# Patient Record
Sex: MALE | Race: WHITE | Employment: OTHER | URBAN - METROPOLITAN AREA
[De-identification: names, ages, dates, MRNs, and addresses within clinical notes are randomized per-mention and may not be internally consistent; named-entity substitution may affect disease eponyms.]

---

## 2017-05-16 ENCOUNTER — CLINICAL SUPPORT (OUTPATIENT)
Dept: CARDIOLOGY CLINIC | Age: 57
End: 2017-05-16

## 2017-05-16 ENCOUNTER — OFFICE VISIT (OUTPATIENT)
Dept: CARDIOLOGY CLINIC | Age: 57
End: 2017-05-16

## 2017-05-16 VITALS
SYSTOLIC BLOOD PRESSURE: 130 MMHG | HEART RATE: 57 BPM | OXYGEN SATURATION: 97 % | BODY MASS INDEX: 28.35 KG/M2 | DIASTOLIC BLOOD PRESSURE: 80 MMHG | HEIGHT: 75 IN | WEIGHT: 228 LBS

## 2017-05-16 DIAGNOSIS — Z95.0 PACEMAKER: ICD-10-CM

## 2017-05-16 DIAGNOSIS — I11.9 BENIGN HYPERTENSIVE HEART DISEASE WITHOUT HEART FAILURE: ICD-10-CM

## 2017-05-16 DIAGNOSIS — R55 VASODEPRESSOR SYNCOPE: ICD-10-CM

## 2017-05-16 DIAGNOSIS — I49.5 SSS (SICK SINUS SYNDROME) (HCC): Primary | ICD-10-CM

## 2017-05-16 DIAGNOSIS — E78.00 HYPERCHOLESTEROLEMIA: ICD-10-CM

## 2017-05-16 DIAGNOSIS — I49.5 SSS (SICK SINUS SYNDROME) (HCC): ICD-10-CM

## 2017-05-16 DIAGNOSIS — I35.0 AORTIC STENOSIS, MILD: Primary | ICD-10-CM

## 2017-05-16 RX ORDER — GLUCOSAMINE SULFATE 1500 MG
POWDER IN PACKET (EA) ORAL DAILY
COMMUNITY

## 2017-05-16 RX ORDER — ROSUVASTATIN CALCIUM 10 MG/1
10 TABLET, COATED ORAL
Qty: 30 TAB | Refills: 6 | Status: SHIPPED | OUTPATIENT
Start: 2017-05-16 | End: 2017-11-27

## 2017-05-16 NOTE — PATIENT INSTRUCTIONS
Please start taking over the counter Coenzyme Q 10 - 200-400 mg daily along the the Crestor 10 mg daily

## 2017-05-16 NOTE — MR AVS SNAPSHOT
Visit Information Date & Time Provider Department Dept. Phone Encounter #  
 5/16/2017  4:00 PM Verónica Harris DO [facility] [id number] Follow-up Instructions Return in about 6 months (around 11/16/2017), or if symptoms worsen or fail to improve. Your Appointments 11/27/2017  2:40 PM  
Follow Up with Verónica Harris DO [facility] ([facility]) Appt Note: Return in about 6 months [place] [id number] [address] [address] [phone] [address] Upcoming Health Maintenance Date Due Hepatitis C Screening 1960 DTaP/Tdap/Td series (1 - Tdap) 2/13/1983 FOBT Q 1 YEAR AGE 50-75 2/13/2012 INFLUENZA AGE 9 TO ADULT 8/1/2017 Allergies as of 5/16/2017  Review Complete On: 5/28/2015 By: Verónica Harris DO No Known Allergies Current Immunizations  Never Reviewed No immunizations on file. Not reviewed this visit You Were Diagnosed With   
  
 Codes Comments Aortic stenosis, mild    -  Primary ICD-10-CM: I35.0 ICD-9-CM: 424.1 Benign hypertensive heart disease without heart failure     ICD-10-CM: I11.9 ICD-9-CM: 402.10   
 SSS (sick sinus syndrome) (HCC)     ICD-10-CM: I49.5 ICD-9-CM: 427.81 Vasodepressor syncope     ICD-10-CM: R55 
ICD-9-CM: 780.2 Pacemaker     ICD-10-CM: Z95.0 ICD-9-CM: V45.01 Vitals BP Pulse Height(growth percentile) Weight(growth percentile) SpO2 BMI  
 140/88 (!) 57 6' 3\" (1.905 m) 228 lb (103.4 kg) 97% 28.5 kg/m2 Smoking Status Never Smoker Vitals History BMI and BSA Data Body Mass Index Body Surface Area 28.5 kg/m 2 2.34 m 2 Preferred Pharmacy Pharmacy Name Phone [phone] [address] [facility], [address] [place] [address] [phone] Your Updated Medication List  
  
   
This list is accurate as of: 5/16/17  4:48 PM.  Always use your most recent med list.  
  
  
  
  
 aspirin delayed-release 81 mg tablet Take 162 mg by mouth daily. FISH -160-1,000 mg Cap Generic drug:  omega 3-dha-epa-fish oil Take  by mouth.  
  
 lisinopril-hydroCHLOROthiazide 20-12.5 mg per tablet Commonly known as:  Loren Wyman Take 1 tablet by mouth daily. multivitamins Chew Take 1 Tab by mouth daily. NexIUM 40 mg capsule Generic drug:  esomeprazole Take 40 mg by mouth every seven (7) days. rosuvastatin 10 mg tablet Commonly known as:  CRESTOR Take 1 Tab by mouth nightly. VITAMIN D3 1,000 unit Cap Generic drug:  cholecalciferol Take  by mouth daily. Prescriptions Sent to Pharmacy Refills  
 rosuvastatin (CRESTOR) 10 mg tablet 6 Sig: Take 1 Tab by mouth nightly. Class: Normal  
 Pharmacy: [address] #: [phone] Route: Oral  
  
We Performed the Following AMB POC EKG ROUTINE W/ 12 LEADS, INTER & REP [84064 CPT(R)] Follow-up Instructions Return in about 6 months (around 11/16/2017), or if symptoms worsen or fail to improve. Patient Instructions Please start taking over the counter Coenzyme Q 10 - 200-400 mg daily along the the Crestor 10 mg daily Introducing \[facility]\"" & HEALTH SERVICES! Leisa Palomino introduces [employer] patient portal. Now you can access parts of your medical record, email your doctor's office, and request medication refills online. 1. In your internet browser, go to [email or web address] 2. Click on the First Time User? Click Here link in the Sign In box. You will see the New Member Sign Up page. 3. Enter your [employer] Access Code exactly as it appears below. You will not need to use this code after youve completed the sign-up process.  If you do not sign up before the expiration date, you must request a new code. · [employer] Access Code: [email or web address] Expires: 8/14/2017  4:48 PM 
 
4. Enter the last four digits of your Social Security Number (xxxx) and Date of Birth (mm/dd/yyyy) as indicated and click Submit. You will be taken to the next sign-up page. 5. Create a [employer] ID. This will be your [employer] login ID and cannot be changed, so think of one that is secure and easy to remember. 6. Create a [employer] password. You can change your password at any time. 7. Enter your Password Reset Question and Answer. This can be used at a later time if you forget your password. 8. Enter your e-mail address. You will receive e-mail notification when new information is available in [address] 9. Click Sign Up. You can now view and download portions of your medical record. 10. Click the Download Summary menu link to download a portable copy of your medical information. If you have questions, please visit the Frequently Asked Questions section of the [employer] website. Remember, [employer] is NOT to be used for urgent needs. For medical emergencies, dial 911. Now available from your iPhone and Android! Please provide this summary of care documentation to your next provider. Your primary care clinician is listed as Carmen Garcia. If you have any questions after today's visit, please call [phone].

## 2017-05-16 NOTE — PROGRESS NOTES
HPI: I saw Raegan Durham in my office today in cardiovascular evaluation regarding sick sinus syndrome and his aortic valvular disease. Mr. Shankar Merchant is a pleasant 56year old  male who has moderate aortic stenosis and also has sick sinus syndrome with significant pauses, for which he is status post a dual chamber Medtronic pacemaker implantation by my associate, Dr. Luisa Laird, back on 11. Historically his problem has actually been vasovagal presyncope with a strong cardioinhibitory component with 4-5 second pauses, which actually prompted the permanent pacemaker.     He also has some history of aortic stenosis, which has been mild back in  and was mild to moderate when checked in 2014, with a mean gradient of 18 mmHg across the valve. He comes in today and relates that he still doing quite well. He gets a little aching in his legs with walking which sounds like possible claudication but he also has some arthritis in his lower low back and have some component of his problem which is related to a spinal stenosis. He is concerned about his potential risk of cardiovascular event and that his older brother just had bypass surgery over at the [facility] within the past several months and his father had heart attacks in his 45s and  of an MI at age 48. Encounter Diagnoses   Name Primary?  Aortic stenosis, mild Yes    Hypertensive heart disease      SSS (sick sinus syndrome) ([address]     Pacemaker     Hypercholesterolemia     Vasodepressor syncope        Discussion: This gentleman appears to be doing reasonably well clinically without any symptoms to suggest worsening aortic stenosis or developing symptomatic obstructive coronary artery disease.   He has a well-preserved second heart sound that I still do not feel that his aortic stenosis is more than moderate am not going to do an echocardiogram at this time although we may consider doing one later this year.    He does have history of very high cholesterol and his most recent cholesterol which was done on March 7, 2017 showed total cholesterol 256 with an HDL of 33, LDL of 195, and VLDL of 28. This is obviously extremely high and in the past we did have him on Crestor at one time, but he came off of that for unclear reasons and I would recommend that we put him back on Crestor and I have sent in a prescription for 10 mg a day and we will plan to repeat a lipid profile in a couple months if he tolerates that medication. I have also asked him to take coenzyme Q 10 200-400 mg daily to guard against potential myalgias from Crestor. In view of his significantly elevated risk of developing symptomatic coronary artery disease in his lifetime. I do think that doing a coronary calcium score would be a good idea to get a better idea about his risk of coronary events in the next 10 years and this may also help us assess how aggressively to treat his cholesterol. I will did check his pacemaker today and he has tenderness half years left on his battery and he had no arrhythmic issues. I will plan to see him again in 6 months. PCP:  Shania Knight MD        Past Medical History:   Diagnosis Date    Cancer [facility])     malignant melanoma removed    Diabetes ([address]     pre-diabetic    Diverticular disease     GERD (gastroesophageal reflux disease)     History of echocardiogram 10/06/2014    EF 59%. No WMA. Gr 1 DDfx. Coronary cusps of AV appear fused. Reduced mobility vs bicuspid. Mild AS (mean grad 18 mmHg).  History of exercise stress test 12/24/2010    [facility]:  Normal stress test at [facility] workload. Ex time 9 min 39 sec.       Hyperlipidemia     Hypertension     Obstructive sleep apnea     probable    Pacemaker 1/21/11    SSS (sick sinus syndrome) (HCC)     with reportedly prolonged pauses of 4 to 5 seconds, status post implantation of dual-chamber Medtronic pacemaker 1/20/11 Past Surgical History:   Procedure Laterality Date    HX HERNIA REPAIR      bilateral    HX ORTHOPAEDIC      foot surgery    HX PACEMAKER  1/20/11    Dual-chamber Medtronic pacemaker         Current Outpatient Rx   Name  Route  Sig  Dispense  Refill    lisinopril-hydrochlorothiazide (PRINZIDE) 20-12.5 mg per tablet    Oral    Take 1 tablet by mouth daily. 90 tablet    3      esomeprazole (NEXIUM) 40 mg capsule    Oral    Take 40 mg by mouth every seven (7) days.  multivitamins Chew    Oral    Take 1 Tab by mouth daily.  aspirin delayed-release 81 mg tablet    Oral    Take 162 mg by mouth daily. No Known Allergies    Social History     Social History    Marital status:      Spouse name: N/A    Number of children: N/A    Years of education: N/A     Social History Main Topics    Smoking status: Never Smoker    Smokeless tobacco: Never Used    Alcohol use Yes      Comment: moderately    Drug use: No    Sexual activity: Not Asked     Other Topics Concern    None     Social History Narrative       No family history on file. Review of Systems:   Constitutional: Positive for malaise/fatigue and weight loss. Negative for chills, diaphoresis and fever. Respiratory: Negative. Cardiovascular: Positive for claudication. Negative for chest pain, palpitations, orthopnea, leg swelling and PND. Gastrointestinal: Positive for abdominal pain. Negative for blood in stool, constipation, diarrhea, heartburn, melena, nausea and vomiting. Musculoskeletal: Positive for back pain, myalgias and neck pain. Negative for falls and joint pain. Neurological: Positive for weakness. Physical Exam:   The patient is an alert, oriented, well developed, well nourished 56 y.o.  male who was in no acute distress at the time of my examination.   Visit Vitals    /80 (BP 1 Location: Left arm, BP Patient Position: Sitting)    Pulse (!) 57    Ht 6' 3\" (1.905 m)    Wt 103.4 kg (228 lb)    SpO2 97%    BMI 28.5 kg/m2        BP Readings from Last 3 Encounters:   05/16/17 130/80   05/28/15 126/78   10/06/14 128/80        Wt Readings from Last 3 Encounters:   05/16/17 103.4 kg (228 lb)   05/28/15 104.8 kg (231 lb)   10/06/14 108 kg (238 lb)       HEENT: Conjunctivae white, mucosa moist, no pallor or cyanosis. Neck: Supple without masses, tenderness or thyromegaly. No jugular venous distention. Carotid upstrokes are full bilaterally, without bruits. Cardiovascular: Chest is symmetrical with good excursion. [place] is not displaced. No lifts, heaves or thrills. S1 and S2 are normal.  There is a grade II/VI ALVA heard at the apex and along the LSB, with radiation to the base, without appreciable diastolic murmurs, rubs, clicks or gallops. Lungs: Clear to auscultation in all fields. Abdomen: Soft. No masses, tenderness or organomegaly. Extremities: No edema, with full peripheral pulses. Review of Data: Please refer to past medical history for most recent cardiac testing. Results for orders placed or performed in visit on 05/16/17   AMB POC EKG ROUTINE W/ 12 LEADS, INTER & REP     Status: None    Narrative    Sinus bradycardia, rate 57. This EKG is within normal limits and similar to the EKG of May 28, 2015. Cristina Brock D.O., F.A.C.C. [facility]  [facility] [place]  [address] [place] [address]. [address]    PLEASE NOTE:  This document has been produced using voice recognition software. Unrecognized errors in transcription may be present.

## 2017-05-16 NOTE — PROGRESS NOTES
Review of Systems   Constitutional: Positive for malaise/fatigue and weight loss. Negative for chills, diaphoresis and fever. Respiratory: Negative. Cardiovascular: Positive for claudication. Negative for chest pain, palpitations, orthopnea, leg swelling and PND. Gastrointestinal: Positive for abdominal pain. Negative for blood in stool, constipation, diarrhea, heartburn, melena, nausea and vomiting. Musculoskeletal: Positive for back pain, myalgias and neck pain. Negative for falls and joint pain. Neurological: Positive for weakness.

## 2017-05-18 NOTE — PROGRESS NOTES
I have personally seen and evaluated the device findings. Interrogation reviewed and I agree with assessment.     Margie Manuel

## 2017-11-27 ENCOUNTER — CLINICAL SUPPORT (OUTPATIENT)
Dept: CARDIOLOGY CLINIC | Age: 57
End: 2017-11-27

## 2017-11-27 ENCOUNTER — OFFICE VISIT (OUTPATIENT)
Dept: CARDIOLOGY CLINIC | Age: 57
End: 2017-11-27

## 2017-11-27 VITALS
HEART RATE: 65 BPM | DIASTOLIC BLOOD PRESSURE: 94 MMHG | OXYGEN SATURATION: 97 % | WEIGHT: 245 LBS | BODY MASS INDEX: 30.62 KG/M2 | SYSTOLIC BLOOD PRESSURE: 142 MMHG

## 2017-11-27 DIAGNOSIS — Z95.0 PACEMAKER: ICD-10-CM

## 2017-11-27 DIAGNOSIS — I11.9 BENIGN HYPERTENSIVE HEART DISEASE WITHOUT HEART FAILURE: ICD-10-CM

## 2017-11-27 DIAGNOSIS — I49.5 SSS (SICK SINUS SYNDROME) (HCC): ICD-10-CM

## 2017-11-27 DIAGNOSIS — I49.5 SSS (SICK SINUS SYNDROME) (HCC): Primary | ICD-10-CM

## 2017-11-27 DIAGNOSIS — I35.0 AORTIC STENOSIS, MILD: Primary | ICD-10-CM

## 2017-11-27 DIAGNOSIS — E78.5 HYPERLIPIDEMIA, UNSPECIFIED HYPERLIPIDEMIA TYPE: ICD-10-CM

## 2017-11-27 NOTE — PROGRESS NOTES
HPI:  I saw Reginald Guzman in my office today in cardiovascular evaluation regarding sick sinus syndrome and his aortic valvular disease. Mr. Gurmeet Solano is a pleasant 56year old  male who has moderate aortic stenosis and also has sick sinus syndrome with significant pauses, for which he is status post a dual chamber Medtronic pacemaker implantation by my associate, Dr. Willi Cheung, back on 1/20/11. Historically his problem has actually been vasovagal presyncope with a strong cardioinhibitory component with 4-5 second pauses, which actually prompted the permanent pacemaker.      He also has some history of aortic stenosis, which has been mild back in 2011 and was mild to moderate when checked in November of 2014, with a mean gradient of 18 mmHg across the valve.     He comes to the office today and relates that he is doing reasonably well currently, but he had a lot of problems with chest heaviness, shortness of breath, and lower extremity weakness as well as tingling in his legs which was so bad when he was in [place] that he went into a hospital there and was worked up with a negative nuclear myocardial perfusion study reportedly. He relates that he subsequently discontinued his Crestor on his own because he read that a lot of the symptoms that he was having might be related to the Crestor and in fact all of his symptoms resolved. He otherwise seems to be doing well without any cardiovascular symptomatology at this time except for 2 pillow orthopnea. Encounter Diagnoses   Name Primary?  Aortic stenosis, mild to moderate Yes    Hypertensive heart disease      SSS (sick sinus syndrome) (HCC)     Pacemaker     Hyperlipidemia, unspecified hyperlipidemia type        Discussion: This gentleman appears to be doing quite well at this juncture and I have no recommendations for change.   He was having a lot of symptoms including chest pain, shortness of breath, and lower extremity weakness all of which resolved when he discontinued Crestor. He does have some risk factors for coronary artery disease and we discussed the pros and cons of whether or not to try other means of lowering his cholesterol, but we decided that the best first test may be to do a coronary calcium score and if that significantly elevated then discuss further treatment of his cholesterol issues and if the coronary calcium score is quite low we will simply not treat his cholesterol in the future. We did check his pacemaker today and his pacemaker is working completely normally without any issues and 10-1/2 years remaining on the dual-chamber Medtronic pacemaker which was placed back in 2011. His blood pressure is borderline elevated today and I recommended that he get his blood pressure checked a few times and if it staying elevated above 027 systolic he should follow-up with Dr. Mandy Craig. Since he is otherwise doing well I will plan to see him in several months. PCP:  Ronel Gavin MD        Past Medical History:   Diagnosis Date    Cancer [facility])     malignant melanoma removed    Diabetes ([address]     pre-diabetic    Diverticular disease     GERD (gastroesophageal reflux disease)     History of echocardiogram 10/06/2014    EF 59%. No WMA. Gr 1 DDfx. Coronary cusps of AV appear fused. Reduced mobility vs bicuspid. Mild AS (mean grad 18 mmHg).  History of exercise stress test 12/24/2010    [facility]:  Normal stress test at [facility] workload. Ex time 9 min 39 sec.       Hyperlipidemia     Hypertension     Obstructive sleep apnea     probable    Pacemaker 1/21/11    SSS (sick sinus syndrome) (HCC)     with reportedly prolonged pauses of 4 to 5 seconds, status post implantation of dual-chamber Medtronic pacemaker 1/20/11         Past Surgical History:   Procedure Laterality Date    HX HERNIA REPAIR      bilateral    HX ORTHOPAEDIC      foot surgery    HX PACEMAKER  1/20/11    Dual-chamber Medtronic pacemaker         Current Outpatient Rx   Name  Route  Sig  Dispense  Refill    lisinopril-hydrochlorothiazide (PRINZIDE) 20-12.5 mg per tablet    Oral    Take 1 tablet by mouth daily. 90 tablet    3      esomeprazole (NEXIUM) 40 mg capsule    Oral    Take 40 mg by mouth every seven (7) days.  multivitamins Chew    Oral    Take 1 Tab by mouth daily.  aspirin delayed-release 81 mg tablet    Oral    Take 162 mg by mouth daily. Allergies   Allergen Reactions    Crestor [Rosuvastatin] Myalgia     And sob        Social History     Social History    Marital status:      Spouse name: N/A    Number of children: N/A    Years of education: N/A     Social History Main Topics    Smoking status: Never Smoker    Smokeless tobacco: Never Used    Alcohol use Yes      Comment: moderately    Drug use: No    Sexual activity: Not Asked     Other Topics Concern    None     Social History Narrative       No family history on file. Review of Systems:   Constitutional: Negative for chills, fever, malaise/fatigue and weight loss. Respiratory: Positive for cough and shortness of breath. Negative for hemoptysis and wheezing. Cardiovascular: Positive for orthopnea. Negative for chest pain, palpitations and leg swelling. Musculoskeletal: Positive for joint pain and myalgias. Negative for falls. Neurological: Positive for dizziness. Physical Exam:   The patient is an alert, oriented, well developed, well nourished 56 y.o.  male who was in no acute distress at the time of my examination.   Visit Vitals    BP (!) 142/94    Pulse 65    Wt 111.1 kg (245 lb)    SpO2 97%    BMI 30.62 kg/m2        BP Readings from Last 3 Encounters:   11/27/17 (!) 142/94   05/16/17 130/80   05/28/15 126/78        Wt Readings from Last 3 Encounters:   11/27/17 111.1 kg (245 lb)   05/16/17 103.4 kg (228 lb)   05/28/15 104.8 kg (231 lb)       HEENT: Conjunctivae white, mucosa moist, no pallor or cyanosis. Neck: Supple without masses, tenderness or thyromegaly. No jugular venous distention. Carotid upstrokes are full bilaterally, without bruits. Cardiovascular: Chest is symmetrical with good excursion. [place] is not displaced. No lifts, heaves or thrills. S1 and S2 are normal.  There is a grade II/VI ALVA heard at the apex and along the LSB, with radiation to the base, without appreciable diastolic murmurs, rubs, clicks or gallops. Lungs: Clear to auscultation in all fields. Abdomen: Soft. No masses, tenderness or organomegaly. Extremities: No edema, with full peripheral pulses. Review of Data: Please refer to past medical history for most recent cardiac testing. Results for orders placed or performed in visit on 11/27/17   AMB POC EKG ROUTINE W/ 12 LEADS, INTER & REP     Status: None    Narrative    Normal sinus rhythm rate 65. Questionable inferior Q's in 3 and aVF to suggest possible past inferior infarction. Otherwise tracing is within normal limits and similar to the EKG of May 16, 2017. Brenna Booth D.O., F.A.C.C. [facility]  [facility] [place]  [address] [facility]. [address]    PLEASE NOTE:  This document has been produced using voice recognition software. Unrecognized errors in transcription may be present.

## 2017-11-27 NOTE — MR AVS SNAPSHOT
Visit Information Date & Time Provider Department Dept. Phone Encounter #  
 11/27/2017  2:40 PM Krunal Castellanos, [address] [facility] [address] [id number] Your Appointments 11/26/2018 10:00 AM  
Follow Up with Krunal Castellanos DO [facility] ([address]) Appt Note: 1 year follow up with an EKG  
 Ryne [place] [id number] [phone] [address] [place] [address] [address] Upcoming Health Maintenance Date Due Hepatitis C Screening 1960 DTaP/Tdap/Td series (1 - Tdap) 2/13/1983 FOBT Q 1 YEAR AGE 50-75 2/13/2012 Influenza Age 5 to Adult 8/1/2017 Allergies as of 11/27/2017  Review Complete On: 11/27/2017 By: Krunal Castellanos DO Severity Noted Reaction Type Reactions Crestor [Rosuvastatin]  11/27/2017    Myalgia And sob Current Immunizations  Never Reviewed No immunizations on file. Not reviewed this visit You Were Diagnosed With   
  
 Codes Comments Aortic stenosis, mild    -  Primary ICD-10-CM: I35.0 ICD-9-CM: 424.1 Benign hypertensive heart disease without heart failure     ICD-10-CM: I11.9 ICD-9-CM: 402.10   
 SSS (sick sinus syndrome) (HCC)     ICD-10-CM: I49.5 ICD-9-CM: 427.81 Pacemaker     ICD-10-CM: Z95.0 ICD-9-CM: V45.01 Hyperlipidemia, unspecified hyperlipidemia type     ICD-10-CM: E78.5 ICD-9-CM: 272.4 Vitals BP Pulse Weight(growth percentile) SpO2 BMI Smoking Status (!) 142/94 65 245 lb (111.1 kg) 97% 30.62 kg/m2 Never Smoker Vitals History BMI and BSA Data Body Mass Index Body Surface Area  
 30.62 kg/m 2 2.42 m 2 Preferred Pharmacy Pharmacy Name Phone [phone] [address] [facility], [address] [place] [address] [phone] Your Updated Medication List  
  
   
 This list is accurate as of: 11/27/17  3:40 PM.  Always use your most recent med list.  
  
  
  
  
 aspirin delayed-release 81 mg tablet Take 162 mg by mouth daily. FISH -160-1,000 mg Cap Generic drug:  omega 3-dha-epa-fish oil Take  by mouth.  
  
 lisinopril-hydroCHLOROthiazide 20-12.5 mg per tablet Commonly known as:  Park Isaacs Take 1 tablet by mouth daily. multivitamins Chew Take 1 Tab by mouth daily. NexIUM 40 mg capsule Generic drug:  esomeprazole Take 40 mg by mouth every seven (7) days. VITAMIN D3 1,000 unit Cap Generic drug:  cholecalciferol Take  by mouth daily. We Performed the Following AMB POC EKG ROUTINE W/ 12 LEADS, INTER & REP [11997 CPT(R)] Introducing [facility]! Kyleigh Elder introduces [facility] patient portal. Now you can access parts of your medical record, email your doctor's office, and request medication refills online. 1. In your internet browser, go to [email or web address] 2. Click on the First Time User? Click Here link in the Sign In box. You will see the New Member Sign Up page. 3. Enter your [facility] Access Code exactly as it appears below. You will not need to use this code after youve completed the sign-up process. If you do not sign up before the expiration date, you must request a new code. · [facility] Access Code: [email or web address] Expires: 2/25/2018  3:40 PM 
 
4. Enter the last four digits of your Social Security Number (xxxx) and Date of Birth (mm/dd/yyyy) as indicated and click Submit. You will be taken to the next sign-up page. 5. Create a "[employer]t ID. This will be your [facility] login ID and cannot be changed, so think of one that is secure and easy to remember. 6. Create a "[employer]t password. You can change your password at any time. 7. Enter your Password Reset Question and Answer. This can be used at a later time if you forget your password. 8. Enter your e-mail address. You will receive e-mail notification when new information is available in [address] 9. Click Sign Up. You can now view and download portions of your medical record. 10. Click the Download Summary menu link to download a portable copy of your medical information. If you have questions, please visit the Frequently Asked Questions section of the [employer] website. Remember, [employer] is NOT to be used for urgent needs. For medical emergencies, dial 911. Now available from your iPhone and Android! Please provide this summary of care documentation to your next provider. Your primary care clinician is listed as Sara Marroquin. If you have any questions after today's visit, please call [phone].

## 2017-11-27 NOTE — PROGRESS NOTES
1. Have you been to the ER, urgent care clinic since your last visit? Hospitalized since your last visit?no    2. Have you seen or consulted any other health care providers outside of the [address] [place] [address] since your last visit? Include any pap smears or colon screening.  no

## 2018-03-12 ENCOUNTER — TELEPHONE (OUTPATIENT)
Dept: CARDIOLOGY CLINIC | Age: 58
End: 2018-03-12

## 2018-03-12 NOTE — TELEPHONE ENCOUNTER
Returned patient's call . .. He is requesting some of his medical records be sent to his . I informed him that he will have to send us a signed medical records release form. He is going to see if his 's office has one . Matthias Gabriel If so, he will fill it out and fax it to us. If not, he will call back to have me fax one to him for him to fill out and return.

## 2019-03-13 ENCOUNTER — OFFICE VISIT (OUTPATIENT)
Dept: CARDIOLOGY CLINIC | Age: 59
End: 2019-03-13

## 2019-03-13 ENCOUNTER — CLINICAL SUPPORT (OUTPATIENT)
Dept: CARDIOLOGY CLINIC | Age: 59
End: 2019-03-13

## 2019-03-13 VITALS
WEIGHT: 235 LBS | OXYGEN SATURATION: 96 % | HEIGHT: 75 IN | BODY MASS INDEX: 29.22 KG/M2 | DIASTOLIC BLOOD PRESSURE: 98 MMHG | HEART RATE: 54 BPM | SYSTOLIC BLOOD PRESSURE: 150 MMHG

## 2019-03-13 DIAGNOSIS — I49.5 SSS (SICK SINUS SYNDROME) (HCC): Primary | ICD-10-CM

## 2019-03-13 DIAGNOSIS — Z95.0 PACEMAKER: ICD-10-CM

## 2019-03-13 DIAGNOSIS — I20.8 EXERTIONAL ANGINA (HCC): ICD-10-CM

## 2019-03-13 DIAGNOSIS — I11.9 BENIGN HYPERTENSIVE HEART DISEASE WITHOUT HEART FAILURE: ICD-10-CM

## 2019-03-13 DIAGNOSIS — I49.5 SSS (SICK SINUS SYNDROME) (HCC): ICD-10-CM

## 2019-03-13 DIAGNOSIS — E78.5 HYPERLIPIDEMIA, UNSPECIFIED HYPERLIPIDEMIA TYPE: ICD-10-CM

## 2019-03-13 DIAGNOSIS — R06.02 SOB (SHORTNESS OF BREATH) ON EXERTION: ICD-10-CM

## 2019-03-13 DIAGNOSIS — I35.0 AORTIC STENOSIS, MILD: Primary | ICD-10-CM

## 2019-03-13 NOTE — PROGRESS NOTES
HPI:   I saw Matthias Wylie in my office today in cardiovascular evaluation regarding sick sinus syndrome and his aortic valvular disease. Mr. Mary Ellen Luevano is a pleasant 61 year old  male who has moderate aortic stenosis and also has sick sinus syndrome with significant pauses, for which he is status post a dual chamber Medtronic pacemaker implantation by my associate, Dr. hSeila Ha, back on 1/20/11. Historically his problem has actually been vasovagal presyncope with a strong cardioinhibitory component with 4-5 second pauses, which actually prompted the permanent pacemaker.      He also has some history of aortic stenosis, which had been mild back in 2011 and was mild to moderate when checked in November of 2014, with a mean gradient of 18 mmHg across the valve. He is not been into the office for well over a year and relates that beginning in December of 2018 he has been having some problems with exertional chest tightness and shortness of breath with exertion. He noticed this particularly when he went out to visit his daughter in [place], [place] and was really having problems with chest tightness and shortness of breath with any significant ambulation there. He relates that all of his episodes of chest tightness and shortness of breath have been occurring with exertion and relieved rather promptly with rest.    Encounter Diagnoses   Name Primary?  Exertional angina (HCC)     Aortic stenosis, possibly severe Yes    SOB (shortness of breath) on exertion     Hypertensive heart disease      Hyperlipidemia, unspecified hyperlipidemia type     [facility]sick [facility] syndrome) ([facility]     Pacemaker        Discussion: This gentleman is having some exertional chest discomfort and shortness of breath which may be all related to worsening aortic stenosis. I am going to get an echocardiogram to determine whether or not his aortic stenosis is severe at this juncture.   If it appears to be severe then I would refer him to Dr. Mildred Dougherty since he would rather have a TAVR then to consider open heart surgery and standard valve replacement. If he does not have what appears to be severe aortic stenosis then I would consider doing a pharmacologic myocardial perfusion study to see if there is any ongoing ischemia which might warrant simply cardiac catheterization and stenting with continue follow-up of his aortic stenosis over time with serial echoes. Lipid profile on him and historically his cholesterol has been borderline elevated as I recall so I am going to get a copy of his latest lipid profile for my records. His blood pressure is significantly elevated today and certainly a case could be made for more aggressive treatment of his blood pressure and when we do his echo if his blood pressure is remaining high I will add another agent probably Norvasc to his regimen, but we will make further recommendations pending results of his echo and his course.     PCP:  Xochilt Gaines MD        Past Medical History:   Diagnosis Date    Cancer [facility])     malignant melanoma removed    Diabetes ([address]     pre-diabetic    Diverticular disease     GERD (gastroesophageal reflux disease)     History of echocardiogram 10/06/2014    EF 59%. No WMA. Gr 1 DDfx. Coronary cusps of AV appear fused. Reduced mobility vs bicuspid. Mild AS (mean grad 18 mmHg).  History of exercise stress test 12/24/2010    [facility]:  Normal stress test at [facility] workload. Ex time 9 min 39 sec.       Hyperlipidemia     Hypertension     Obstructive sleep apnea     probable    Pacemaker 1/21/11    SSS (sick sinus syndrome) (HCC)     with reportedly prolonged pauses of 4 to 5 seconds, status post implantation of dual-chamber Medtronic pacemaker 1/20/11         Past Surgical History:   Procedure Laterality Date    HX HERNIA REPAIR      bilateral    HX ORTHOPAEDIC      foot surgery    HX PACEMAKER  1/20/11    Dual-chamber Medtronic pacemaker     Current Outpatient Medications   Medication Sig    GARLIC PO Take  by mouth.  omega 3-dha-epa-fish oil (FISH OIL) 100-160-1,000 mg cap Take  by mouth.  cholecalciferol (VITAMIN D3) 1,000 unit cap Take  by mouth daily.  lisinopril-hydrochlorothiazide (PRINZIDE) 20-12.5 mg per tablet Take 1 tablet by mouth daily.  esomeprazole (NEXIUM) 40 mg capsule Take 40 mg by mouth every seven (7) days.  multivitamins Chew Take 1 Tab by mouth daily.  aspirin delayed-release 81 mg tablet Take 162 mg by mouth daily. No current facility-administered medications for this visit. Allergies   Allergen Reactions    Crestor [Rosuvastatin] Myalgia     And sob        Social History     Socioeconomic History    Marital status:      Spouse name: Not on file    Number of children: Not on file    Years of education: Not on file    Highest education level: Not on file   Tobacco Use    Smoking status: Never Smoker    Smokeless tobacco: Never Used   Substance and Sexual Activity    Alcohol use: Yes     Comment: moderately    Drug use: No       History reviewed. No pertinent family history. Review of Systems:   Constitutional: Negative. Respiratory: Positive for cough and shortness of breath. Negative for hemoptysis and wheezing. Cardiovascular: Negative. Gastrointestinal: Positive for abdominal pain. Negative for blood in stool, constipation, diarrhea, heartburn, melena, nausea and vomiting. Musculoskeletal: Positive for myalgias. Negative for falls and joint pain. Neurological: Negative for dizziness. Physical Exam:   The patient is an alert, oriented, well developed, well nourished 64 y.o.  male who was in no acute distress at the time of my examination.   Visit Vitals  BP (!) 150/98   Pulse (!) 54   Ht 6' 3\" (1.905 m)   Wt 106.6 kg (235 lb)   SpO2 96%   BMI 29.37 kg/m²        BP Readings from Last 3 Encounters:   03/13/19 (!) 150/98   11/27/17 (!) 142/94   05/16/17 130/80        Wt Readings from Last 3 Encounters:   03/13/19 106.6 kg (235 lb)   11/27/17 111.1 kg (245 lb)   05/16/17 103.4 kg (228 lb)       HEENT: Conjunctivae white, mucosa moist, no pallor or cyanosis. Neck: Supple without masses, tenderness or thyromegaly. No jugular venous distention. Carotid upstrokes are full bilaterally, without bruits. Cardiovascular: Chest is symmetrical with good excursion. [place] is not displaced. No lifts, heaves or thrills. S1 is normal with decrease A2 or S2. There is a grade III/VI ALVA harsh mid to late peaking ALVA heard loudest at the base, but also along the LSB without appreciable diastolic murmurs, rubs, clicks or gallops. Lungs: Clear to auscultation in all fields. Abdomen: Soft. No masses, tenderness or organomegaly. Extremities: No edema, with full peripheral pulses. Review of Data: Please refer to past medical history for most recent cardiac testing. Results for orders placed or performed in visit on 03/13/19   AMB POC EKG ROUTINE W/ 12 LEADS, INTER & REP     Status: None    Narrative    Sinus bradycardia, rate 54. This EKG is otherwise within normal limits and similar to the EKG of November 27, 2017. Black Almeida D.O., F.A.C.C. [facility]  [facility] [place]  [address] [place] [address]. [address] [facility]  [phone]    PLEASE NOTE:  This document has been produced using voice recognition software. Unrecognized errors in transcription may be present.

## 2019-03-15 NOTE — PROGRESS NOTES
I have personally seen and evaluated the device findings. Interrogation reviewed and I agree with assessment.     Kraig Frazier

## 2019-06-19 ENCOUNTER — OFFICE VISIT (OUTPATIENT)
Dept: CARDIOLOGY CLINIC | Age: 59
End: 2019-06-19

## 2019-06-19 DIAGNOSIS — Z95.0 PACEMAKER: ICD-10-CM

## 2019-06-19 DIAGNOSIS — I49.5 SSS (SICK SINUS SYNDROME) (HCC): Primary | ICD-10-CM

## 2019-07-15 NOTE — PROGRESS NOTES
I have personally seen and evaluated the device findings. Interrogation reviewed and I agree with assessment.     Ericka Vergara

## 2019-09-15 NOTE — PROGRESS NOTES
Dustin Knight presents today for a 6 month check-up. He was supposed to follow-up with Dr. Steven Kelly but due to a scheduling conflict, the appointment had to be rescheduled. He was last seen by Dr Steven Kelly on 3/13/19. At that time, he was supposed to have an echocardiogram done for reevaluation of his aortic stenosis. However, it was not done. He was also supposed to consult with Dr. Janessa Crespo and Mr. Darbykarla Leyva thinks he may have missed 2 appointments with him due to other commitments. He is a 64year old male with history of moderate aortic stenosis and also has sick sinus syndrome with significant pauses (s/p dual chamber Medtronic pacemaker implantation on 1/20/11). His main problem has been vasovagal presyncope with a strong cardioinhibitory component with 4-5 second pauses, which prompted the permanent pacemaker implant. He also has history of HCVD and hyperlipidemia. His last stress test was done in Aug. 2017 and it was presumed negative for myocardial ischemia. His last echocardiogram was done in November 2014 and it showed a mean gradient of 18 mm across the aortic valve. Since his last visit with Dr. Steven Kelly in March 2019, he has become increasingly fatigued and over the past several months he has noticed \"all over body aches\" and weakness which is currently being evaluated by his primary care physician. He has been under increased stress and states that he has noticed intermittent chest tightness across his chest that seems to occur with exertion. He states that his arms have felt heavy and he has also had some shortness of breath over the past month. However, his primary care physician is also doing work-up for some pulmonary complaints. Denies chest pain, heaviness, and palpitations. Denies shortness of breath at rest, admits to dyspnea on exertion, denies orthopnea and PND. Denies abdominal bloating. Denies lightheadedness, dizziness, and syncope.    Denies lower extremity edema and claudication. Denies nausea, vomiting, diarrhea, melena, hematochezia. Denies hematuria, urgency, frequency. Denies fever, chills. PMH:  Past Medical History:   Diagnosis Date    Cancer [facility])     malignant melanoma removed    Diabetes ([address]     pre-diabetic    Diverticular disease     GERD (gastroesophageal reflux disease)     History of echocardiogram 10/06/2014    EF 59%. No WMA. Gr 1 DDfx. Coronary cusps of AV appear fused. Reduced mobility vs bicuspid. Mild AS (mean grad 18 mmHg).  History of exercise stress test 12/24/2010    [facility]:  Normal stress test at [facility] workload. Ex time 9 min 39 sec.  Hyperlipidemia     Hypertension     Obstructive sleep apnea     probable    Pacemaker 1/21/11    SSS (sick sinus syndrome) (HCC)     with reportedly prolonged pauses of 4 to 5 seconds, status post implantation of dual-chamber Medtronic pacemaker 1/20/11       PSH:  Past Surgical History:   Procedure Laterality Date    HX HERNIA REPAIR      bilateral    HX ORTHOPAEDIC      foot surgery    HX PACEMAKER  1/20/11    Dual-chamber Medtronic pacemaker       MEDS:  Current Outpatient Medications   Medication Sig    GARLIC PO Take  by mouth.  omega 3-dha-epa-fish oil (FISH OIL) 100-160-1,000 mg cap Take  by mouth.  cholecalciferol (VITAMIN D3) 1,000 unit cap Take  by mouth daily.  lisinopril-hydrochlorothiazide (PRINZIDE) 20-12.5 mg per tablet Take 1 tablet by mouth daily.  esomeprazole (NEXIUM) 40 mg capsule Take 40 mg by mouth every seven (7) days.  multivitamins Chew Take 1 Tab by mouth daily.  aspirin delayed-release 81 mg tablet Take 162 mg by mouth daily. No current facility-administered medications for this visit. Allergies and Sensitivities:  Allergies   Allergen Reactions    Crestor [Rosuvastatin] Myalgia     And sob        Family History:  No family history on file. Social History:  He  reports that he has never smoked.  He has never used smokeless tobacco.  He  reports that he drinks alcohol. Physical:  Visit Vitals  /72 (BP 1 Location: Left arm, BP Patient Position: Sitting)   Pulse 72   Ht 6' 3\" (1.905 m)   Wt 106.1 kg (234 lb)   SpO2 97%   BMI 29.25 kg/m²         Exam:  Neck:  Supple, no JVD, no carotid bruits  CV:  Normal S1 and  S2,  Grade II-III/VI ALVA noted, no rubs, or gallops noted  Lungs:  Clear to ausculation throughout, no wheezes or rales  Abd:  Soft, non-tender, non-distended with good bowel sounds. No hepatosplenomegaly  Extremities:  No edema      Data:  EKG:      LABS:  Lab Results   Component Value Date/Time    Sodium 141 01/13/2011 04:28 PM    Potassium 3.9 01/13/2011 04:28 PM    Chloride 105 01/13/2011 04:28 PM    CO2 31 01/13/2011 04:28 PM    Glucose 82 01/13/2011 04:28 PM    BUN 15 01/13/2011 04:28 PM    Creatinine 1.3 01/13/2011 04:28 PM     No results found for: CHOL, CHOLX, CHLST, CHOLV, HDL, HDLP, LDL, LDLC, DLDLP, TGLX, TRIGL, TRIGP, CHHD, CHHDX  Lab Results   Component Value Date/Time    ALT (SGPT) 40 01/13/2011 04:28 PM         Impression/Plan:  1. Sick sinus syndrome, s/p permanent pacemaker implant in 2011  2. Moderate aortic stenosis  3. Hyperlipidemia, not on a statin    Mr. Emma Chong was seen today for a 6 month check-up. Since his last visit, he has noticed increasing fatigue and over the past month or so he has noticed shortness of breath as well as chest tightness with activity. Along with the chest tightness he has also noticed bilateral arm heaviness. He admits to increased stress over the past several months. His last stress test was done in Aug. 2017 and it was presumed negative for myocardial ischemia. His last echocardiogram was done in November 2014 and it showed a mean gradient of 18 mm across the aortic valve. During his last appointment with Dr. Rosina Lopez, an echocardiogram was ordered but it appears that it was canceled.   He was also supposed to see Dr. Mary Galicia in consultation with regards to his aortic stenosis but Mr. Ellis Samples states that he believes he may have missed 2 appointments that were scheduled due to other commitments. His exertional chest tightness and shortness of breath may be related to worsening aortic stenosis. An echocardiogram will again be ordered as the one ordered earlier this year was not performed. I will also request that he have a pharmacologic nuclear stress test done for completeness. He states that his primary care physician is also doing pulmonary work-up as well as work-up for his complaints of \"all over body aches\", joint and muscle pain, as well as weakness. He will follow-up with Dr. Leland Pandey as scheduled and as needed. Harshal Miner MSN, FNP-BC    Please note:  Portions of this chart were created with Dragon medical speech to text program.  Unrecognized errors may be present.

## 2019-09-20 ENCOUNTER — OFFICE VISIT (OUTPATIENT)
Dept: CARDIOLOGY CLINIC | Age: 59
End: 2019-09-20

## 2019-09-20 VITALS
SYSTOLIC BLOOD PRESSURE: 106 MMHG | DIASTOLIC BLOOD PRESSURE: 72 MMHG | HEART RATE: 72 BPM | BODY MASS INDEX: 29.09 KG/M2 | OXYGEN SATURATION: 97 % | WEIGHT: 234 LBS | HEIGHT: 75 IN

## 2019-09-20 DIAGNOSIS — Z95.0 PACEMAKER: ICD-10-CM

## 2019-09-20 DIAGNOSIS — I11.9 BENIGN HYPERTENSIVE HEART DISEASE WITHOUT HEART FAILURE: Primary | ICD-10-CM

## 2019-09-20 DIAGNOSIS — I49.5 SSS (SICK SINUS SYNDROME) (HCC): ICD-10-CM

## 2019-09-20 DIAGNOSIS — R53.83 FATIGUE, UNSPECIFIED TYPE: ICD-10-CM

## 2019-09-20 DIAGNOSIS — R07.89 CHEST TIGHTNESS: ICD-10-CM

## 2019-09-20 DIAGNOSIS — R06.02 SHORTNESS OF BREATH: ICD-10-CM

## 2019-09-20 DIAGNOSIS — E78.5 HYPERLIPIDEMIA, UNSPECIFIED HYPERLIPIDEMIA TYPE: ICD-10-CM

## 2019-09-20 NOTE — PROGRESS NOTES
Dustin Vus presents today for   Chief Complaint   Patient presents with    Aortic Stenosis     follow up    Shortness of Breath     exertion, [id number]    Fatigue     daily    Chest Pain     intermittent left, hurting pain, exertion       Dustin Knight preferred language for health care discussion is english/other. Is someone accompanying this pt? no    Is the patient using any DME equipment during [address]? no    Depression Screening:  3 most recent PHQ Screens 3/13/2019   Little interest or pleasure in doing things Not at all   Feeling down, depressed, irritable, or hopeless Not at all   Total Score PHQ 2 0       Learning Assessment:  Learning Assessment 3/11/2014   PRIMARY LEARNER Patient   HIGHEST LEVEL OF EDUCATION - PRIMARY LEARNER  DID NOT GRADUATE HIGH SCHOOL   BARRIERS PRIMARY LEARNER NONE   CO-LEARNER CAREGIVER No   PRIMARY LANGUAGE ENGLISH   LEARNER PREFERENCE PRIMARY DEMONSTRATION   ANSWERED BY patient   RELATIONSHIP SELF       Abuse Screening:  No flowsheet data found. Fall Risk  Fall Risk Assessment, last 12 mths 3/11/2014   Able to walk? Yes   Fall in past 12 months? No       Pt currently taking Anticoagulant therapy? ASA 81mg every day     Coordination of Care:  1. Have you been to the ER, urgent care clinic since your last visit? Hospitalized since your last visit? no    2. Have you seen or consulted any other health care providers outside of the [address] [place] [address] since your last visit? Include any pap smears or colon screening.  no

## 2019-09-20 NOTE — PATIENT INSTRUCTIONS
Echocardiogram; Dx:  SOB, chest tightness, fatigue  Pharmacologic nuclear stress test;  Dx:  SOB, chest tightness, fatigue  Follow-up with Dr. Babs Brown as scheduled and as needed

## 2019-09-22 ENCOUNTER — APPOINTMENT (OUTPATIENT)
Dept: GENERAL RADIOLOGY | Age: 59
DRG: 280 | End: 2019-09-22
Attending: INTERNAL MEDICINE
Payer: COMMERCIAL

## 2019-09-22 ENCOUNTER — HOSPITAL ENCOUNTER (INPATIENT)
Age: 59
LOS: 1 days | Discharge: OTHER INSTITUTION | DRG: 280 | End: 2019-09-23
Attending: HOSPITALIST | Admitting: INTERNAL MEDICINE
Payer: COMMERCIAL

## 2019-09-22 PROBLEM — J81.1 PULMONARY EDEMA: Status: ACTIVE | Noted: 2019-09-22

## 2019-09-22 PROBLEM — R09.02 HYPOXIA: Status: ACTIVE | Noted: 2019-09-22

## 2019-09-22 PROBLEM — J20.9 ACUTE BRONCHITIS: Status: ACTIVE | Noted: 2019-09-22

## 2019-09-22 PROBLEM — I21.4 NSTEMI (NON-ST ELEVATED MYOCARDIAL INFARCTION) (HCC): Status: ACTIVE | Noted: 2019-09-22

## 2019-09-22 LAB
ALBUMIN SERPL-MCNC: 3.3 G/DL (ref 3.4–5)
ALBUMIN/GLOB SERPL: 1.3 {RATIO} (ref 0.8–1.7)
ALP SERPL-CCNC: 74 U/L (ref 45–117)
ALT SERPL-CCNC: 22 U/L (ref 16–61)
ANION GAP SERPL CALC-SCNC: 7 MMOL/L (ref 3–18)
APTT PPP: 32.2 SEC (ref 23–36.4)
APTT PPP: 35.4 SEC (ref 23–36.4)
ARTERIAL PATENCY WRIST A: YES
AST SERPL-CCNC: 22 U/L (ref 10–38)
ATRIAL RATE: 73 BPM
BASE EXCESS BLD CALC-SCNC: 3 MMOL/L
BDY SITE: ABNORMAL
BILIRUB DIRECT SERPL-MCNC: 0.1 MG/DL (ref 0–0.2)
BILIRUB SERPL-MCNC: 0.6 MG/DL (ref 0.2–1)
BNP SERPL-MCNC: 3084 PG/ML (ref 0–900)
BUN SERPL-MCNC: 20 MG/DL (ref 7–18)
BUN/CREAT SERPL: 24 (ref 12–20)
CALCIUM SERPL-MCNC: 8.1 MG/DL (ref 8.5–10.1)
CALCULATED P AXIS, ECG09: 57 DEGREES
CALCULATED R AXIS, ECG10: -19 DEGREES
CALCULATED T AXIS, ECG11: 88 DEGREES
CHLORIDE SERPL-SCNC: 106 MMOL/L (ref 100–111)
CHOLEST SERPL-MCNC: 224 MG/DL
CK MB CFR SERPL CALC: 2.7 % (ref 0–4)
CK MB SERPL-MCNC: 6.8 NG/ML (ref 5–25)
CK SERPL-CCNC: 249 U/L (ref 39–308)
CO2 SERPL-SCNC: 29 MMOL/L (ref 21–32)
CREAT SERPL-MCNC: 0.84 MG/DL (ref 0.6–1.3)
DIAGNOSIS, 93000: NORMAL
ERYTHROCYTE [DISTWIDTH] IN BLOOD BY AUTOMATED COUNT: 13 % (ref 11.6–14.5)
EST. AVERAGE GLUCOSE BLD GHB EST-MCNC: 117 MG/DL
GAS FLOW.O2 O2 DELIVERY SYS: ABNORMAL L/MIN
GAS FLOW.O2 SETTING OXYMISER: 2 L/M
GLOBULIN SER CALC-MCNC: 2.5 G/DL (ref 2–4)
GLUCOSE BLD STRIP.AUTO-MCNC: 105 MG/DL (ref 70–110)
GLUCOSE BLD STRIP.AUTO-MCNC: 142 MG/DL (ref 70–110)
GLUCOSE SERPL-MCNC: 116 MG/DL (ref 74–99)
HBA1C MFR BLD: 5.7 % (ref 4.2–5.6)
HCO3 BLD-SCNC: 27.1 MMOL/L (ref 22–26)
HCT VFR BLD AUTO: 40.1 % (ref 36–48)
HDLC SERPL-MCNC: 35 MG/DL (ref 40–60)
HDLC SERPL: 6.4 {RATIO} (ref 0–5)
HGB BLD-MCNC: 13.8 G/DL (ref 13–16)
INR PPP: 1.1 (ref 0.8–1.2)
LDLC SERPL CALC-MCNC: 162.6 MG/DL (ref 0–100)
LIPID PROFILE,FLP: ABNORMAL
MCH RBC QN AUTO: 32.8 PG (ref 24–34)
MCHC RBC AUTO-ENTMCNC: 34.4 G/DL (ref 31–37)
MCV RBC AUTO: 95.2 FL (ref 74–97)
P-R INTERVAL, ECG05: 198 MS
PCO2 BLD: 41.1 MMHG (ref 35–45)
PH BLD: 7.43 [PH] (ref 7.35–7.45)
PLATELET # BLD AUTO: 232 K/UL (ref 135–420)
PMV BLD AUTO: 9.6 FL (ref 9.2–11.8)
PO2 BLD: 102 MMHG (ref 80–100)
POTASSIUM SERPL-SCNC: 3.7 MMOL/L (ref 3.5–5.5)
PROT SERPL-MCNC: 5.8 G/DL (ref 6.4–8.2)
PROTHROMBIN TIME: 13.8 SEC (ref 11.5–15.2)
Q-T INTERVAL, ECG07: 404 MS
QRS DURATION, ECG06: 110 MS
QTC CALCULATION (BEZET), ECG08: 445 MS
RBC # BLD AUTO: 4.21 M/UL (ref 4.7–5.5)
SAO2 % BLD: 98 % (ref 92–97)
SERVICE CMNT-IMP: ABNORMAL
SODIUM SERPL-SCNC: 142 MMOL/L (ref 136–145)
SPECIMEN TYPE: ABNORMAL
T4 FREE SERPL-MCNC: 1 NG/DL (ref 0.7–1.5)
TOTAL RESP. RATE, ITRR: 17
TRIGL SERPL-MCNC: 132 MG/DL (ref ?–150)
TROPONIN I SERPL-MCNC: 1.35 NG/ML (ref 0–0.04)
TROPONIN I SERPL-MCNC: 1.56 NG/ML (ref 0–0.04)
TSH SERPL DL<=0.05 MIU/L-ACNC: 1.89 UIU/ML (ref 0.36–3.74)
VENTRICULAR RATE, ECG03: 73 BPM
VLDLC SERPL CALC-MCNC: 26.4 MG/DL
WBC # BLD AUTO: 8.9 K/UL (ref 4.6–13.2)

## 2019-09-22 PROCEDURE — 94640 AIRWAY INHALATION TREATMENT: CPT

## 2019-09-22 PROCEDURE — 80061 LIPID PANEL: CPT

## 2019-09-22 PROCEDURE — 80048 BASIC METABOLIC PNL TOTAL CA: CPT

## 2019-09-22 PROCEDURE — 74011250637 HC RX REV CODE- 250/637: Performed by: INTERNAL MEDICINE

## 2019-09-22 PROCEDURE — 84439 ASSAY OF FREE THYROXINE: CPT

## 2019-09-22 PROCEDURE — 36415 COLL VENOUS BLD VENIPUNCTURE: CPT

## 2019-09-22 PROCEDURE — 83036 HEMOGLOBIN GLYCOSYLATED A1C: CPT

## 2019-09-22 PROCEDURE — 71046 X-RAY EXAM CHEST 2 VIEWS: CPT

## 2019-09-22 PROCEDURE — 82962 GLUCOSE BLOOD TEST: CPT

## 2019-09-22 PROCEDURE — 36600 WITHDRAWAL OF ARTERIAL BLOOD: CPT

## 2019-09-22 PROCEDURE — 77010033678 HC OXYGEN DAILY

## 2019-09-22 PROCEDURE — 82803 BLOOD GASES ANY COMBINATION: CPT

## 2019-09-22 PROCEDURE — 83880 ASSAY OF NATRIURETIC PEPTIDE: CPT

## 2019-09-22 PROCEDURE — 84443 ASSAY THYROID STIM HORMONE: CPT

## 2019-09-22 PROCEDURE — 65660000004 HC RM CVT STEPDOWN

## 2019-09-22 PROCEDURE — 80076 HEPATIC FUNCTION PANEL: CPT

## 2019-09-22 PROCEDURE — 85610 PROTHROMBIN TIME: CPT

## 2019-09-22 PROCEDURE — 93005 ELECTROCARDIOGRAM TRACING: CPT

## 2019-09-22 PROCEDURE — 74011000250 HC RX REV CODE- 250: Performed by: INTERNAL MEDICINE

## 2019-09-22 PROCEDURE — 85027 COMPLETE CBC AUTOMATED: CPT

## 2019-09-22 PROCEDURE — 82550 ASSAY OF CK (CPK): CPT

## 2019-09-22 PROCEDURE — 94762 N-INVAS EAR/PLS OXIMTRY CONT: CPT

## 2019-09-22 PROCEDURE — 74011250637 HC RX REV CODE- 250/637

## 2019-09-22 PROCEDURE — 85730 THROMBOPLASTIN TIME PARTIAL: CPT

## 2019-09-22 PROCEDURE — 74011000250 HC RX REV CODE- 250

## 2019-09-22 PROCEDURE — 74011250636 HC RX REV CODE- 250/636: Performed by: INTERNAL MEDICINE

## 2019-09-22 RX ORDER — DOCUSATE SODIUM 100 MG/1
100 CAPSULE, LIQUID FILLED ORAL 2 TIMES DAILY
Status: DISCONTINUED | OUTPATIENT
Start: 2019-09-22 | End: 2019-09-23 | Stop reason: HOSPADM

## 2019-09-22 RX ORDER — NITROGLYCERIN 40 MG/100ML
20 INJECTION INTRAVENOUS CONTINUOUS
Status: DISCONTINUED | OUTPATIENT
Start: 2019-09-22 | End: 2019-09-23

## 2019-09-22 RX ORDER — DEXTROSE MONOHYDRATE 100 MG/ML
125-250 INJECTION, SOLUTION INTRAVENOUS AS NEEDED
Status: DISCONTINUED | OUTPATIENT
Start: 2019-09-22 | End: 2019-09-23 | Stop reason: HOSPADM

## 2019-09-22 RX ORDER — ONDANSETRON 2 MG/ML
4 INJECTION INTRAMUSCULAR; INTRAVENOUS
Status: DISCONTINUED | OUTPATIENT
Start: 2019-09-22 | End: 2019-09-23 | Stop reason: HOSPADM

## 2019-09-22 RX ORDER — IPRATROPIUM BROMIDE AND ALBUTEROL SULFATE 2.5; .5 MG/3ML; MG/3ML
3 SOLUTION RESPIRATORY (INHALATION)
Status: DISCONTINUED | OUTPATIENT
Start: 2019-09-22 | End: 2019-09-23

## 2019-09-22 RX ORDER — HEPARIN SODIUM 1000 [USP'U]/ML
4000 INJECTION, SOLUTION INTRAVENOUS; SUBCUTANEOUS ONCE
Status: COMPLETED | OUTPATIENT
Start: 2019-09-22 | End: 2019-09-22

## 2019-09-22 RX ORDER — PREDNISONE 10 MG/1
TABLET ORAL
Refills: 0 | COMMUNITY
Start: 2019-09-20 | End: 2019-11-12 | Stop reason: ALTCHOICE

## 2019-09-22 RX ORDER — NITROGLYCERIN 0.4 MG/1
TABLET SUBLINGUAL
Status: COMPLETED
Start: 2019-09-22 | End: 2019-09-22

## 2019-09-22 RX ORDER — SODIUM CHLORIDE 0.9 % (FLUSH) 0.9 %
5-40 SYRINGE (ML) INJECTION EVERY 8 HOURS
Status: DISCONTINUED | OUTPATIENT
Start: 2019-09-22 | End: 2019-09-23 | Stop reason: HOSPADM

## 2019-09-22 RX ORDER — ACETAMINOPHEN 500 MG
500 TABLET ORAL
Status: DISCONTINUED | OUTPATIENT
Start: 2019-09-22 | End: 2019-09-23 | Stop reason: HOSPADM

## 2019-09-22 RX ORDER — ADHESIVE BANDAGE
30 BANDAGE TOPICAL DAILY PRN
Status: DISCONTINUED | OUTPATIENT
Start: 2019-09-22 | End: 2019-09-23 | Stop reason: HOSPADM

## 2019-09-22 RX ORDER — SODIUM CHLORIDE 0.9 % (FLUSH) 0.9 %
5-40 SYRINGE (ML) INJECTION AS NEEDED
Status: DISCONTINUED | OUTPATIENT
Start: 2019-09-22 | End: 2019-09-23 | Stop reason: HOSPADM

## 2019-09-22 RX ORDER — GUAIFENESIN 100 MG/5ML
81 LIQUID (ML) ORAL DAILY
Status: DISCONTINUED | OUTPATIENT
Start: 2019-09-23 | End: 2019-09-23 | Stop reason: HOSPADM

## 2019-09-22 RX ORDER — LISINOPRIL 20 MG/1
20 TABLET ORAL DAILY
Status: DISCONTINUED | OUTPATIENT
Start: 2019-09-22 | End: 2019-09-23 | Stop reason: HOSPADM

## 2019-09-22 RX ORDER — HEPARIN SODIUM 5000 [USP'U]/ML
5000 INJECTION, SOLUTION INTRAVENOUS; SUBCUTANEOUS ONCE
Status: DISCONTINUED | OUTPATIENT
Start: 2019-09-22 | End: 2019-09-22

## 2019-09-22 RX ORDER — HEPARIN SODIUM 1000 [USP'U]/ML
3000 INJECTION, SOLUTION INTRAVENOUS; SUBCUTANEOUS ONCE
Status: COMPLETED | OUTPATIENT
Start: 2019-09-22 | End: 2019-09-22

## 2019-09-22 RX ORDER — BUDESONIDE 0.5 MG/2ML
500 INHALANT ORAL
Status: DISCONTINUED | OUTPATIENT
Start: 2019-09-22 | End: 2019-09-23

## 2019-09-22 RX ORDER — FUROSEMIDE 10 MG/ML
40 INJECTION INTRAMUSCULAR; INTRAVENOUS 2 TIMES DAILY
Status: DISCONTINUED | OUTPATIENT
Start: 2019-09-22 | End: 2019-09-23 | Stop reason: HOSPADM

## 2019-09-22 RX ORDER — HYDROCODONE POLISTIREX AND CHLORPHENIRAMINE POLISTIREX 10; 8 MG/5ML; MG/5ML
5 SUSPENSION, EXTENDED RELEASE ORAL
Refills: 0 | COMMUNITY
Start: 2019-09-20 | End: 2019-11-12 | Stop reason: ALTCHOICE

## 2019-09-22 RX ORDER — NITROGLYCERIN 0.4 MG/1
0.4 TABLET SUBLINGUAL AS NEEDED
Status: DISCONTINUED | OUTPATIENT
Start: 2019-09-22 | End: 2019-09-23 | Stop reason: HOSPADM

## 2019-09-22 RX ORDER — NITROGLYCERIN 40 MG/100ML
INJECTION INTRAVENOUS
Status: COMPLETED
Start: 2019-09-22 | End: 2019-09-22

## 2019-09-22 RX ORDER — CLONAZEPAM 1 MG/1
0.5 TABLET ORAL
COMMUNITY
Start: 2019-07-22

## 2019-09-22 RX ORDER — AZITHROMYCIN 250 MG/1
500 TABLET, FILM COATED ORAL DAILY
Status: DISCONTINUED | OUTPATIENT
Start: 2019-09-22 | End: 2019-09-23 | Stop reason: HOSPADM

## 2019-09-22 RX ORDER — INSULIN LISPRO 100 [IU]/ML
INJECTION, SOLUTION INTRAVENOUS; SUBCUTANEOUS
Status: DISCONTINUED | OUTPATIENT
Start: 2019-09-22 | End: 2019-09-23 | Stop reason: HOSPADM

## 2019-09-22 RX ORDER — HEPARIN SODIUM 10000 [USP'U]/100ML
9-25 INJECTION, SOLUTION INTRAVENOUS
Status: DISCONTINUED | OUTPATIENT
Start: 2019-09-22 | End: 2019-09-23

## 2019-09-22 RX ORDER — MAGNESIUM SULFATE 100 %
4 CRYSTALS MISCELLANEOUS AS NEEDED
Status: DISCONTINUED | OUTPATIENT
Start: 2019-09-22 | End: 2019-09-23 | Stop reason: HOSPADM

## 2019-09-22 RX ORDER — DEXTROSE 50 % IN WATER (D50W) INTRAVENOUS SYRINGE
25-50 AS NEEDED
Status: DISCONTINUED | OUTPATIENT
Start: 2019-09-22 | End: 2019-09-22

## 2019-09-22 RX ORDER — ALBUTEROL SULFATE 90 UG/1
2 AEROSOL, METERED RESPIRATORY (INHALATION)
COMMUNITY
Start: 2019-09-20

## 2019-09-22 RX ORDER — CLONAZEPAM 0.5 MG/1
0.5 TABLET ORAL
Status: DISCONTINUED | OUTPATIENT
Start: 2019-09-22 | End: 2019-09-23 | Stop reason: HOSPADM

## 2019-09-22 RX ORDER — HYDROCODONE POLISTIREX AND CHLORPHENIRAMINE POLISTIREX 10; 8 MG/5ML; MG/5ML
5 SUSPENSION, EXTENDED RELEASE ORAL
COMMUNITY
Start: 2019-09-20 | End: 2019-11-12 | Stop reason: ALTCHOICE

## 2019-09-22 RX ADMIN — BUDESONIDE 500 MCG: 0.5 INHALANT RESPIRATORY (INHALATION) at 14:20

## 2019-09-22 RX ADMIN — IPRATROPIUM BROMIDE AND ALBUTEROL SULFATE 3 ML: .5; 3 SOLUTION RESPIRATORY (INHALATION) at 14:20

## 2019-09-22 RX ADMIN — NITROGLYCERIN 0.4 MG: 0.4 TABLET SUBLINGUAL at 13:46

## 2019-09-22 RX ADMIN — HEPARIN SODIUM 3000 UNITS: 1000 INJECTION, SOLUTION INTRAVENOUS; SUBCUTANEOUS at 22:11

## 2019-09-22 RX ADMIN — ACETAMINOPHEN 500 MG: 500 TABLET ORAL at 17:38

## 2019-09-22 RX ADMIN — HEPARIN SODIUM 4000 UNITS: 1000 INJECTION, SOLUTION INTRAVENOUS; SUBCUTANEOUS at 11:00

## 2019-09-22 RX ADMIN — NITROGLYCERIN 20 MCG/MIN: 40 INJECTION INTRAVENOUS at 14:29

## 2019-09-22 RX ADMIN — BUDESONIDE 500 MCG: 0.5 INHALANT RESPIRATORY (INHALATION) at 21:03

## 2019-09-22 RX ADMIN — LISINOPRIL 20 MG: 20 TABLET ORAL at 13:21

## 2019-09-22 RX ADMIN — Medication 10 ML: at 13:23

## 2019-09-22 RX ADMIN — DOCUSATE SODIUM 100 MG: 100 CAPSULE, LIQUID FILLED ORAL at 17:36

## 2019-09-22 RX ADMIN — IPRATROPIUM BROMIDE AND ALBUTEROL SULFATE 3 ML: .5; 3 SOLUTION RESPIRATORY (INHALATION) at 21:03

## 2019-09-22 RX ADMIN — AZITHROMYCIN 500 MG: 250 TABLET, FILM COATED ORAL at 13:21

## 2019-09-22 RX ADMIN — FUROSEMIDE 40 MG: 10 INJECTION, SOLUTION INTRAMUSCULAR; INTRAVENOUS at 13:22

## 2019-09-22 RX ADMIN — HEPARIN SODIUM 950 UNITS/HR: 10000 INJECTION, SOLUTION INTRAVENOUS at 11:00

## 2019-09-22 RX ADMIN — FUROSEMIDE 40 MG: 10 INJECTION, SOLUTION INTRAMUSCULAR; INTRAVENOUS at 17:36

## 2019-09-22 NOTE — PROGRESS NOTES
1330: Dr. Lisa Whitfield at bedside. Verbal order of sublingual nitro. Family at bedside. Administered scheduled meds. 1429: Nitro drip, New bag started. 1606: Assessment completed. Admission documents completed. Lab at bedside. 1740: Administered scheduled meds. Patient asked for prn tylenol for headache. Patient had prodcutive cough with small bloog clot like sputem.

## 2019-09-22 NOTE — CONSULTS
[facility] - Consult Note    Consultation request by Declan Roca MD for advice/opinion related to evaluating NSTEMI (non-ST elevated myocardial infarction) [facility]) [I21.4]    Date of  Admission: 9/22/2019  8:41 AM   Primary Care Physician:  Avani Gruber MD     Assessment:     Patient Active Problem List   Diagnosis Code    GERD (gastroesophageal reflux disease) K21.9    Hyperlipidemia E78.5    Obstructive sleep apnea G47.33    SSS (sick sinus syndrome) ([facility] I49.5    Diverticular disease K57.90    Pacemaker Z95.0    Hypertensive heart disease  I11.9    Aortic stenosis, moderately severe I35.0    Vasodepressor syncope in past R55    NSTEMI (non-ST elevated myocardial infarction) ([facility] with evidence of recent anterior transmural MI by EKG criteria. I21.4    Pulmonary edema J81.1    Hypoxia R09.02    Acute bronchitis J20.9          Plan: 1. Therapeutic heparin drip. 2. IV NTG drip. 3. Echocardiogram in AM to evaluate LV function and severity of AS  4. Lasix 40 mg IV twice daily. 5. Will need to start PCSK9 inhibitor since he can't take statins. 6. Cardiac cath probably in AM pending response to treatment of CHF and his course. History of Present Illness: This is a 64 y.o. male admitted for NSTEMI (non-ST elevated myocardial infarction) ([facility] [I21.4] and CHF. This patient is well-known to me from past consultations. He has history of sick sinus syndrome with significant pauses which she is status post a dual-chamber Medtronic pacemaker implantation by my associate Dr. Meera Hdz back on January 20, 2011. Historically he he has history of vasovagal syncope with a strong cardioinhibitory component with 4 to 5-second pauses which actually prompted the permanent pacemaker placement.   He also has moderately severe aortic stenosis and when I saw him back on March 13, 2019 he was giving history of some exertional chest tightness which I thought might be either related to worsening aortic stenosis or possibly obstructive coronary artery disease. The patient was supposed to have an echocardiogram followed by a stress nuclear myocardial perfusion study of his aortic stenosis was not severe, but he never followed up and tells me that over the past few months he has been having more more problems with chest tightness with exertion as well as bilateral upper arm discomfort with exertion relieved by rest in a few minutes. He has had some prolonged episodes as long as 15 or 20 minutes and there have been days when he had it on and off all day, but he just tried to push through it. He relates that last night he had severe shortness of breath with lying down prompting him to go to the emergency room down at [facility] and he was found to have elevated troponins and an elevated NT proBNP with chest x-ray evidence of heart failure prompting diuretic therapy and subsequent transfer here for further evaluation. It should be noted that he was in the office and saw my nurse practitioner for these symptoms on September 20, 2019 and was to have an echocardiogram and a stress nuclear myocardial perfusion study completed this coming week. His EKG in our office on that day suggested an interim anterior wall myocardial infarction of indeterminate age as compared with his EKG on March 13, 2019. Cardiac risk factors: family history, dyslipidemia, male gender, hypertension      Review of Symptoms:  Except as stated above include:  Constitutional:  negative  Respiratory:  History of cough and bronchitis  Cardiovascular:  Positive for exertional angina and shortness of breath with exertion as well as PND and orthopnea beginning last night  Gastrointestinal: negative  Genitourinary:  negative  Musculoskeletal:  Negative  Neurological:  Negative  Dermatological:  Negative  Endocrinological: Negative  Psychological:  Past history of anxiety.     A comprehensive review of systems was negative except for that written in the HPI. Past Medical History:     Past Medical History:   Diagnosis Date    Aortic stenosis, mild 5/21/2012    Cancer ([facility]     malignant melanoma removed    Diabetes ([facility]     pre-diabetic    Diverticular disease     GERD (gastroesophageal reflux disease)     History of echocardiogram 10/06/2014    EF 59%. No WMA. Gr 1 DDfx. Coronary cusps of AV appear fused. Reduced mobility vs bicuspid. Mild AS (mean grad 18 mmHg).  History of exercise stress test 12/24/2010    [facility]:  Normal stress test at [facility] workload. Ex time 9 min 39 sec.  Hyperlipidemia     Hypertension     Hypertensive heart disease  10/20/2011    Obstructive sleep apnea     probable    Pacemaker 1/21/11    SSS (sick sinus syndrome) (HCC)     with reportedly prolonged pauses of 4 to 5 seconds, status post implantation of dual-chamber Medtronic pacemaker 1/20/11    Vasodepressor syncope 5/21/2012         Social History:     Social History     Socioeconomic History    Marital status:      Spouse name: Not on file    Number of children: Not on file    Years of education: Not on file    Highest education level: Not on file   Tobacco Use    Smoking status: Never Smoker    Smokeless tobacco: Never Used   Substance and Sexual Activity    Alcohol use: Yes     Comment: moderately    Drug use: No        Family History:   No family history on file. Medications:      Allergies   Allergen Reactions    Crestor [Rosuvastatin] Myalgia     And sob         Current Facility-Administered Medications   Medication Dose Route Frequency    sodium chloride (NS) flush 5-40 mL  5-40 mL IntraVENous Q8H    sodium chloride (NS) flush 5-40 mL  5-40 mL IntraVENous PRN    magnesium hydroxide (MILK OF MAGNESIA) 400 mg/5 mL oral suspension 30 mL  30 mL Oral DAILY PRN    docusate sodium (COLACE) capsule 100 mg  100 mg Oral BID    [START ON 9/23/2019] aspirin chewable tablet 81 mg  81 mg Oral DAILY    ondansetron (ZOFRAN) injection 4 mg  4 mg IntraVENous Q4H PRN    clonazePAM (KlonoPIN) tablet 0.5 mg  0.5 mg Oral DAILY PRN    lisinopril (PRINIVIL, ZESTRIL) tablet 20 mg  20 mg Oral DAILY    albuterol-ipratropium (DUO-NEB) 2.5 MG-0.5 MG/3 ML  3 mL Nebulization Q6H RT    budesonide (PULMICORT) 500 mcg/2 ml nebulizer suspension  500 mcg Nebulization BID RT    azithromycin (ZITHROMAX) tablet 500 mg  500 mg Oral DAILY    insulin lispro (HUMALOG) injection   SubCUTAneous AC&HS    glucose chewable tablet 16 g  4 Tab Oral PRN    glucagon (GLUCAGEN) injection 1 mg  1 mg IntraMUSCular PRN    heparin 25,000 units in D5W 250 ml infusion  9-25 Units/kg/hr IntraVENous TITRATE    dextrose 10% infusion 125-250 mL  125-250 mL IntraVENous PRN    furosemide (LASIX) injection 40 mg  40 mg IntraVENous BID    nitroglycerin (NITROSTAT) tablet 0.4 mg  0.4 mg SubLINGual PRN    nitroglycerin (TRIDIL) 400 mcg/ml infusion  20 mcg/min IntraVENous CONTINUOUS    acetaminophen (TYLENOL) tablet 500 mg  500 mg Oral Q4H PRN         Physical Exam:     Visit Vitals  /87   Pulse 68   Temp 97.4 °F (36.3 °C)   Resp 16   Wt 107.4 kg (236 lb 11.2 oz)   SpO2 98%   BMI 29.59 kg/m²     BP Readings from Last 3 Encounters:   09/22/19 115/87   09/20/19 106/72   03/13/19 (!) 150/98     Pulse Readings from Last 3 Encounters:   09/22/19 68   09/20/19 72   03/13/19 (!) 54     Wt Readings from Last 3 Encounters:   09/22/19 107.4 kg (236 lb 11.2 oz)   09/20/19 106.1 kg (234 lb)   03/13/19 106.6 kg (235 lb)       General:  alert, cooperative, no distress, appears stated age  Neck:  +JVD  Lungs:  Few bibasilar rales otherwise clear to auscultation bilaterally  Heart:  regular rate and rhythm, S1 is normal.decrease A2 or S2. There is a grade III/VI ALVA harsh mid to late peaking ALVA heard loudest at the base, but also along the LSB.  No click, rub or gallop  Abdomen:  abdomen is soft without significant tenderness, masses, organomegaly or guarding  Extremities:  extremities normal, atraumatic, no cyanosis with 1 + edema  Skin: Warm and dry. no hyperpigmentation, vitiligo, or suspicious lesions  Neuro: alert, oriented x3, affect appropriate, no focal neurological deficits, moves all extremities well, no involuntary movements, reflexes at knee and ankle intact  Psych: non focal     Data Review:     Recent Labs     09/22/19  1000   WBC 8.9   HGB 13.8   HCT 40.1        Recent Labs     09/22/19  1000      K 3.7      CO2 29   *   BUN 20*   CREA 0.84   CA 8.1*   ALB 3.3*   SGOT 22   ALT 22   INR 1.1       Results for orders placed or performed during the hospital encounter of 09/22/19   EKG, 12 LEAD, INITIAL   Result Value Ref Range    Ventricular Rate 73 BPM    Atrial Rate 73 BPM    P-R Interval 198 ms    QRS Duration 110 ms    Q-T Interval 404 ms    QTC Calculation (Bezet) 445 ms    Calculated P Axis 57 degrees    Calculated R Axis -19 degrees    Calculated T Axis 88 degrees    Diagnosis       Normal sinus rhythm  Possible Left atrial enlargement  Anteroseptal infarct , age undetermined  Abnormal ECG  No previous ECGs available     Results for orders placed or performed in visit on 03/13/19   AMB POC EKG ROUTINE W/ 12 LEADS, INTER & REP    Narrative    Sinus bradycardia, rate 54. This EKG is otherwise within normal limits and similar to the EKG of November 27, 2017. Results for orders placed or performed in visit on 10/06/14   PACEMAKER CHECK    Impression    A-paced - 44%; V-sensed - 100%; V-paced - 0.4%; A-sensed - 56%;   Lead impedances and threshold WNL; some runs of PVCS       All Cardiac Markers in the last 24 hours:    Lab Results   Component Value Date/Time     09/22/2019 10:00 AM    CKMB 6.8 (H) 09/22/2019 10:00 AM    CKND1 2.7 09/22/2019 10:00 AM    TROIQ 1.56 ([facility]) 09/22/2019 10:00 AM       Last Lipid:    Lab Results   Component Value Date/Time    Cholesterol, total 224 (H) 09/22/2019 10:00 AM    HDL Cholesterol 35 (L) 09/22/2019 10:00 AM    LDL, calculated 162.6 (H) 09/22/2019 10:00 AM    Triglyceride 132 09/22/2019 10:00 AM    CHOL/HDL Ratio 6.4 (H) 09/22/2019 10:00 AM       Signed By: Bandar Reyes DO     September 22, 2019

## 2019-09-22 NOTE — PROGRESS NOTES
conducted an initial consultation and Spiritual Assessment for Megan Kingston, who is a 64 y. o.,male. Patients Primary Language is: [place]. According to the patients EMR Gnosticist Affiliation is: Yarsani.     The reason the Patient came to the hospital is:   Patient Active Problem List    Diagnosis Date Noted    NSTEMI (non-ST elevated myocardial infarction) ([facility] [address] 09/22/2019    Pulmonary edema 09/22/2019    Hypoxia 09/22/2019    Acute bronchitis 09/22/2019    Aortic stenosis, mild 05/21/2012    Vasodepressor syncope 05/21/2012    Hypertensive heart disease  10/20/2011    Diverticular disease     Pacemaker     GERD (gastroesophageal reflux disease)     Hyperlipidemia     Obstructive sleep apnea     [facility]         The  provided the following Interventions:  Initiated a relationship of care and support. Explored issues of niraj, belief, spirituality and Sabianism/ritual needs while hospitalized. Listened empathically. Provided information about Spiritual Care Services. Offered prayer and assurance of continued prayers on patient's behalf. Chart reviewed. The following outcomes where achieved:  Patient shared limited information about both their medical narrative and spiritual journey/beliefs.  confirmed Patient's Gnosticist Affiliation. Patient expressed gratitude for 's visit. Assessment:  Patient is concern about his fluid in his lungs and more test concerning his heart. Prayed with him and offered him encouragement. Patient does not have any Sabianism/cultural needs that will affect patients preferences in health care. Plan:  Chaplains will continue to follow and will provide pastoral care on an as needed/requested basis.  recommends bedside caregivers page  on duty if patient shows signs of acute spiritual or emotional distress.     [address]  ([phone])

## 2019-09-22 NOTE — PROGRESS NOTES
Bedside shift change report given to [facility] (oncoming nurse) by Serafin Hidalgo (offgoing nurse). Report included the following information SBAR, Kardex and MAR.

## 2019-09-22 NOTE — H&P
History & Physical    Patient: Tracy Lopez MRN: [id number]  CSN: [id number]    YOB: 1960  Age: 64 y.o. Sex: male      DOA: 9/22/2019  CC: chest pain, shortness of breath (transferred from [facility])    PCP: Avani Gruber MD       HPI:     Tracy Lopez is a 64 y.o. male with medical co-morbidities including HTN, SSS with PPM,  DM, hyperlipidemia, VAHE, presented to ER yesterday with worsened shortness of breath and chest discomfort with feeling of passing out. He has non-productive cough and chest congestion over the last week, and recently saw PCP with treatment of steroid and albuterol inhaler on Friday. Last night, his symptom worsened with associated diaphoresis and chest pressure, right shoulder pain. He was seen in [facility] ER in [place] where chest xray showed pulmonary congestion, elevated troponin, elevated proBNP. ABG showed pO2 76. He was started on Hep gtt, lasix IV 40mg, Nitropaste. Vancomycin and Cefepime was given, blood culture were done as well. Currently, he has cough with improved in his symptom. No history of lung disease, has leg swelling bilaterally in the last weeks. No recent long distance travel. No sick contact     Review of Systems  GENERAL: No fever, No chill, + malaise   HEENT: No change in vision, no ear ache, tinnitus, + sore throat or sinus congestion. NECK: No pain or stiffness. PULMONARY: + shortness of breath, + cough or wheeze. Cardiovascular: no pnd / orthopnea, no Chest Pain  GASTROINTESTINAL: No abd pain, No nausea/vomiting, No diarrhea, No melena or bright red blood per rectum. GENITOURINARY: +urinary frequency, No urgency or pain with urination. MUSCULOSKELETAL: + joint or muscle pain, no back pain, no recent trauma. DERMATOLOGIC: No rash, no itching, no lesions. ENDOCRINE: No polyuria, polydipsia, NO heat or cold intolerance. No recent change in weight. HEMATOLOGICAL: No easy bruising or bleeding. NEUROLOGIC: No headache, No seizures, No generalized weakness         Past Medical History:   Diagnosis Date    Aortic stenosis, mild 5/21/2012    Cancer ([facility] [address]     malignant melanoma removed    Diabetes ([facility] [address]     pre-diabetic    Diverticular disease     GERD (gastroesophageal reflux disease)     History of echocardiogram 10/06/2014    EF 59%. No WMA. Gr 1 DDfx. Coronary cusps of AV appear fused. Reduced mobility vs bicuspid. Mild AS (mean grad 18 mmHg).  History of exercise stress test 12/24/2010    [facility]:  Normal stress test at [facility] workload. Ex time 9 min 39 sec.  Hyperlipidemia     Hypertension     Hypertensive heart disease  10/20/2011    Obstructive sleep apnea     probable    Pacemaker 1/21/11    SSS (sick sinus syndrome) (HCC)     with reportedly prolonged pauses of 4 to 5 seconds, status post implantation of dual-chamber Medtronic pacemaker 1/20/11    Vasodepressor syncope 5/21/2012       Past Surgical History:   Procedure Laterality Date    HX HERNIA REPAIR      bilateral    HX ORTHOPAEDIC      foot surgery    HX PACEMAKER  1/20/11    Dual-chamber Medtronic pacemaker       No family history on file. Social History     Socioeconomic History    Marital status:      Spouse name: Not on file    Number of children: Not on file    Years of education: Not on file    Highest education level: Not on file   Tobacco Use    Smoking status: Never Smoker    Smokeless tobacco: Never Used   Substance and Sexual Activity    Alcohol use: Yes     Comment: moderately    Drug use: No       Prior to Admission medications    Medication Sig Start Date End Date Taking? Authorizing Provider   albuterol (PROVENTIL HFA, VENTOLIN HFA, PROAIR HFA) 90 mcg/actuation inhaler Take 2 Puffs by inhalation every six (6) hours as needed. 9/20/19  Yes Provider, Historical   clonazePAM (KLONOPIN) 1 mg tablet Take 0.5 mg by mouth daily as needed.  7/22/19  Yes Provider, Historical chlorpheniramine-HYDROcodone (TUSSIONEX) 10-8 mg/5 mL suspension Take 5 mL by mouth two (2) times daily as needed. 9/20/19  Yes Provider, Historical   chlorpheniramine-HYDROcodone (TUSSIONEX) 10-8 mg/5 mL suspension Take 5 mL by mouth two (2) times daily as needed. 9/20/19  Yes Provider, Historical   predniSONE (DELTASONE) 10 mg tablet TK 4 TS PO QAM FOR 2 DAYS THEN 3 QAM FOR 2 DAYS THEN 2 FOR 2 DAYS THEN 1 T PO FOR 2 DAYS 9/20/19  Yes Provider, Historical   GARLIC PO Take  by mouth. Yes Provider, Historical   omega 3-dha-epa-fish oil (FISH OIL) 100-160-1,000 mg cap Take  by mouth. Yes Provider, Historical   cholecalciferol (VITAMIN D3) 1,000 unit cap Take  by mouth daily. Yes Provider, Historical   lisinopril-hydrochlorothiazide (PRINZIDE) 20-12.5 mg per tablet Take 1 tablet by mouth daily. 10/6/14  Yes Doralee Smoke, DO   esomeprazole (NEXIUM) 40 mg capsule Take 40 mg by mouth every seven (7) days. Yes Provider, Historical   multivitamins Chew Take 1 Tab by mouth daily. Yes Provider, Historical   aspirin delayed-release 81 mg tablet Take 162 mg by mouth daily. Yes Provider, Historical       Allergies   Allergen Reactions    Crestor [Rosuvastatin] Myalgia     And sob               Physical Exam:      Visit Vitals  /86   Pulse 67   Temp 97.8 °F (36.6 °C)   Resp 18   SpO2 94%       Physical Exam:  Tele:   General:  Cooperative, Not in acute distress, speaks in full sentence while in bed  HEENT: PERRL, EOMI, supple neck, no JVD, dry oral mucosa  Cardiovascular: S1S2 regular, no rub/gallop   Pulmonary: Clear air entry bilaterally, no wheezing, ++ crackle  GI:  Soft, non tender, non distended, +bs, no guarding   Extremities:  trace pedal edema, +distal pulses appreciated   Neuro: AOx3, moving all extremities, no gross deficit.      Lab/Data Review:  Labs: Results:       Chemistry Recent Labs     09/22/19  1000   *      K 3.7      CO2 29   BUN 20*   CREA 0.84   CA 8.1*   AGAP 7 BUCR 24*   AP 74   TP 5.8*   ALB 3.3*   GLOB 2.5   AGRAT 1.3      CBC w/Diff Recent Labs     09/22/19  1000   WBC 8.9   RBC 4.21*   HGB 13.8   HCT 40.1         Coagulation Recent Labs     09/22/19  1000   PTP 13.8   INR 1.1   APTT 32.2       Iron/Ferritin No results for input(s): IRON in the last 72 hours. No lab exists for component: TIBCCALC   BNP No results for input(s): BNPP in the last 72 hours. Cardiac Enzymes Recent Labs     09/22/19  1000      CKND1 2.7      Liver Enzymes Recent Labs     09/22/19  1000   TP 5.8*   ALB 3.3*   AP 74   SGOT 22      Thyroid Studies Lab Results   Component Value Date/Time    TSH 1.89 09/22/2019 10:00 AM          All Micro Results     None          Imaging Reviewed:  Pending repeat chest xray. But reviewed result from previous ER. Assessment:   Active Problems:    NSTEMI (non-ST elevated myocardial infarction) ([facility] (9/22/2019)      Pulmonary edema (9/22/2019)      Hypoxia (9/22/2019)      Acute bronchitis (9/22/2019)      Hyperlipidemia ()    Chronic Problems:    GERD     Depression/Anxiety on lorazepam     HTN     Hyperlipidemia     Pacemaker present, h/o SSS      ?VAHE. DM2 (reported in records)        Plan:     Admitted to telemetry with cardiac enzyme trending. Will need repeat chest xray, stat Labs. Needs to resume his Hep gtt per protocol. Will give lasix after re-check CXR. Spoke with Dr. Melani Lazo for further consult. Echo ordered. He needs to be on statin, listed as allergy, will need follow up. Resume bronchodilator and budesonide, hold steroid. Will continue Azithromycin for acute bronchitis. ISS  OOB and PT/OT if needed.    ICS   DVT prophylaxis: hep gtt    Full Code    Marci Barraza MD  9/22/2019, 10:06 AM

## 2019-09-23 ENCOUNTER — APPOINTMENT (OUTPATIENT)
Dept: NON INVASIVE DIAGNOSTICS | Age: 59
DRG: 280 | End: 2019-09-23
Attending: INTERNAL MEDICINE
Payer: COMMERCIAL

## 2019-09-23 VITALS
HEIGHT: 75 IN | WEIGHT: 234 LBS | TEMPERATURE: 98.3 F | HEART RATE: 74 BPM | SYSTOLIC BLOOD PRESSURE: 124 MMHG | RESPIRATION RATE: 21 BRPM | DIASTOLIC BLOOD PRESSURE: 65 MMHG | OXYGEN SATURATION: 94 % | BODY MASS INDEX: 29.09 KG/M2

## 2019-09-23 LAB
ANION GAP SERPL CALC-SCNC: 5 MMOL/L (ref 3–18)
APTT PPP: 41.7 SEC (ref 23–36.4)
BASOPHILS # BLD: 0 K/UL (ref 0–0.1)
BASOPHILS NFR BLD: 1 % (ref 0–2)
BNP SERPL-MCNC: 835 PG/ML (ref 0–900)
BUN SERPL-MCNC: 18 MG/DL (ref 7–18)
BUN/CREAT SERPL: 20 (ref 12–20)
CALCIUM SERPL-MCNC: 8.4 MG/DL (ref 8.5–10.1)
CHLORIDE SERPL-SCNC: 102 MMOL/L (ref 100–111)
CO2 SERPL-SCNC: 31 MMOL/L (ref 21–32)
CREAT SERPL-MCNC: 0.92 MG/DL (ref 0.6–1.3)
DIFFERENTIAL METHOD BLD: ABNORMAL
ECHO AO ROOT DIAM: 3.79 CM
ECHO AV AREA PEAK VELOCITY: 0.6 CM2
ECHO AV AREA VTI: 0.6 CM2
ECHO AV AREA/BSA PEAK VELOCITY: 0.2 CM2/M2
ECHO AV AREA/BSA VTI: 0.2 CM2/M2
ECHO AV MEAN GRADIENT: 34.3 MMHG
ECHO AV PEAK GRADIENT: 54.9 MMHG
ECHO AV PEAK VELOCITY: 370.44 CM/S
ECHO AV REGURGITANT PHT: 443.1 CM
ECHO AV VTI: 82.33 CM
ECHO LA AREA 4C: 23.5 CM2
ECHO LA VOL 2C: 108.56 ML (ref 18–58)
ECHO LA VOL 4C: 71.66 ML (ref 18–58)
ECHO LA VOL BP: 93.45 ML (ref 18–58)
ECHO LA VOL/BSA BIPLANE: 39.84 ML/M2 (ref 16–28)
ECHO LA VOLUME INDEX A2C: 46.28 ML/M2 (ref 16–28)
ECHO LA VOLUME INDEX A4C: 30.55 ML/M2 (ref 16–28)
ECHO LV E' LATERAL VELOCITY: 4.55 CM/S
ECHO LV E' SEPTAL VELOCITY: 6.59 CM/S
ECHO LV EDV A2C: 180.6 ML
ECHO LV EDV A4C: 204.2 ML
ECHO LV EDV BP: 192.4 ML (ref 67–155)
ECHO LV EDV INDEX A4C: 87 ML/M2
ECHO LV EDV INDEX BP: 82 ML/M2
ECHO LV EDV NDEX A2C: 77 ML/M2
ECHO LV EJECTION FRACTION A2C: 30 %
ECHO LV EJECTION FRACTION A4C: 36 %
ECHO LV EJECTION FRACTION BIPLANE: 32.5 % (ref 55–100)
ECHO LV ESV A2C: 126.6 ML
ECHO LV ESV A4C: 131.3 ML
ECHO LV ESV BP: 129.9 ML (ref 22–58)
ECHO LV ESV INDEX A2C: 54 ML/M2
ECHO LV ESV INDEX A4C: 56 ML/M2
ECHO LV ESV INDEX BP: 55.4 ML/M2
ECHO LV GLOBAL LONGITUDINAL STRAIN (GLS): -8.4 %
ECHO LV INTERNAL DIMENSION DIASTOLIC: 5.75 CM (ref 4.2–5.9)
ECHO LV INTERNAL DIMENSION SYSTOLIC: 4.66 CM
ECHO LV IVSD: 0.74 CM (ref 0.6–1)
ECHO LV MASS 2D: 211.8 G (ref 88–224)
ECHO LV MASS INDEX 2D: 90.3 G/M2 (ref 49–115)
ECHO LV POSTERIOR WALL DIASTOLIC: 0.92 CM (ref 0.6–1)
ECHO LVOT DIAM: 2.22 CM
ECHO LVOT PEAK GRADIENT: 1.1 MMHG
ECHO LVOT PEAK VELOCITY: 53.2 CM/S
ECHO LVOT VTI: 11.75 CM
ECHO MV A VELOCITY: 40.08 CM/S
ECHO MV E DECELERATION TIME (DT): 121.5 MS
ECHO MV E VELOCITY: 72.59 CM/S
ECHO MV E/A RATIO: 1.81
ECHO MV E/E' LATERAL: 15.95
ECHO MV E/E' RATIO (AVERAGED): 13.48
ECHO MV E/E' SEPTAL: 11.02
ECHO RV TAPSE: 2.13 CM (ref 1.5–2)
ECHO TV REGURGITANT MAX VELOCITY: 253.95 CM/S
ECHO TV REGURGITANT PEAK GRADIENT: 25.8 MMHG
EOSINOPHIL # BLD: 0.2 K/UL (ref 0–0.4)
EOSINOPHIL NFR BLD: 2 % (ref 0–5)
ERYTHROCYTE [DISTWIDTH] IN BLOOD BY AUTOMATED COUNT: 13.3 % (ref 11.6–14.5)
GLUCOSE BLD STRIP.AUTO-MCNC: 134 MG/DL (ref 70–110)
GLUCOSE BLD STRIP.AUTO-MCNC: 142 MG/DL (ref 70–110)
GLUCOSE SERPL-MCNC: 119 MG/DL (ref 74–99)
HCT VFR BLD AUTO: 43.2 % (ref 36–48)
HGB BLD-MCNC: 14.6 G/DL (ref 13–16)
LYMPHOCYTES # BLD: 2.2 K/UL (ref 0.9–3.6)
LYMPHOCYTES NFR BLD: 29 % (ref 21–52)
MCH RBC QN AUTO: 32.4 PG (ref 24–34)
MCHC RBC AUTO-ENTMCNC: 33.8 G/DL (ref 31–37)
MCV RBC AUTO: 96 FL (ref 74–97)
MONOCYTES # BLD: 0.5 K/UL (ref 0.05–1.2)
MONOCYTES NFR BLD: 6 % (ref 3–10)
NEUTS SEG # BLD: 4.6 K/UL (ref 1.8–8)
NEUTS SEG NFR BLD: 62 % (ref 40–73)
PISA AR MAX VEL: 439.74 CM/S
PLATELET # BLD AUTO: 233 K/UL (ref 135–420)
PMV BLD AUTO: 9.7 FL (ref 9.2–11.8)
POTASSIUM SERPL-SCNC: 3.5 MMOL/L (ref 3.5–5.5)
RBC # BLD AUTO: 4.5 M/UL (ref 4.7–5.5)
SODIUM SERPL-SCNC: 138 MMOL/L (ref 136–145)
WBC # BLD AUTO: 7.5 K/UL (ref 4.6–13.2)

## 2019-09-23 PROCEDURE — 74011250636 HC RX REV CODE- 250/636: Performed by: INTERNAL MEDICINE

## 2019-09-23 PROCEDURE — 83880 ASSAY OF NATRIURETIC PEPTIDE: CPT

## 2019-09-23 PROCEDURE — 94640 AIRWAY INHALATION TREATMENT: CPT

## 2019-09-23 PROCEDURE — 82962 GLUCOSE BLOOD TEST: CPT

## 2019-09-23 PROCEDURE — 74011250637 HC RX REV CODE- 250/637: Performed by: INTERNAL MEDICINE

## 2019-09-23 PROCEDURE — 77030027138 HC INCENT SPIROMETER -A

## 2019-09-23 PROCEDURE — 85025 COMPLETE CBC W/AUTO DIFF WBC: CPT

## 2019-09-23 PROCEDURE — 94762 N-INVAS EAR/PLS OXIMTRY CONT: CPT

## 2019-09-23 PROCEDURE — 80048 BASIC METABOLIC PNL TOTAL CA: CPT

## 2019-09-23 PROCEDURE — 74011250636 HC RX REV CODE- 250/636

## 2019-09-23 PROCEDURE — 36415 COLL VENOUS BLD VENIPUNCTURE: CPT

## 2019-09-23 PROCEDURE — 74011000250 HC RX REV CODE- 250: Performed by: INTERNAL MEDICINE

## 2019-09-23 PROCEDURE — 85730 THROMBOPLASTIN TIME PARTIAL: CPT

## 2019-09-23 PROCEDURE — 93306 TTE W/DOPPLER COMPLETE: CPT

## 2019-09-23 RX ORDER — HEPARIN SODIUM 1000 [USP'U]/ML
3000 INJECTION, SOLUTION INTRAVENOUS; SUBCUTANEOUS ONCE
Status: COMPLETED | OUTPATIENT
Start: 2019-09-23 | End: 2019-09-23

## 2019-09-23 RX ORDER — PANTOPRAZOLE SODIUM 40 MG/1
40 TABLET, DELAYED RELEASE ORAL
Status: DISCONTINUED | OUTPATIENT
Start: 2019-09-24 | End: 2019-09-23 | Stop reason: HOSPADM

## 2019-09-23 RX ORDER — HEPARIN SODIUM 1000 [USP'U]/ML
INJECTION, SOLUTION INTRAVENOUS; SUBCUTANEOUS
Status: DISCONTINUED
Start: 2019-09-23 | End: 2019-09-23 | Stop reason: HOSPADM

## 2019-09-23 RX ADMIN — AZITHROMYCIN 500 MG: 250 TABLET, FILM COATED ORAL at 08:08

## 2019-09-23 RX ADMIN — HEPARIN SODIUM 3000 UNITS: 1000 INJECTION, SOLUTION INTRAVENOUS; SUBCUTANEOUS at 09:38

## 2019-09-23 RX ADMIN — FUROSEMIDE 40 MG: 10 INJECTION, SOLUTION INTRAMUSCULAR; INTRAVENOUS at 08:08

## 2019-09-23 RX ADMIN — ASPIRIN 81 MG 81 MG: 81 TABLET ORAL at 08:08

## 2019-09-23 RX ADMIN — Medication 10 ML: at 14:00

## 2019-09-23 RX ADMIN — IPRATROPIUM BROMIDE AND ALBUTEROL SULFATE 3 ML: .5; 3 SOLUTION RESPIRATORY (INHALATION) at 02:31

## 2019-09-23 RX ADMIN — Medication 10 ML: at 06:00

## 2019-09-23 RX ADMIN — IPRATROPIUM BROMIDE AND ALBUTEROL SULFATE 3 ML: .5; 3 SOLUTION RESPIRATORY (INHALATION) at 08:57

## 2019-09-23 RX ADMIN — BUDESONIDE 500 MCG: 0.5 INHALANT RESPIRATORY (INHALATION) at 08:57

## 2019-09-23 NOTE — PROGRESS NOTES
This was done when the patient was in the hospital and he was transferred to Dr. Menard Figures service for TAVR. Nothing to do.  ES

## 2019-09-23 NOTE — PROGRESS NOTES
Discharge planning    [facility] present to pickup patient for transfer to [facility]/ bed H528. [employer] set up transport. Nurses Sheila & Emperatriz aware.     JUSTIN RicardoN, RN  Pager # [phone]  Care Manager

## 2019-09-23 NOTE — PROGRESS NOTES
0700: Bedside report received from Christine Patel, [place]. SBAR consisted of Information from the following report(s) SBAR, Kardex, Intake/Output, MAR, Recent Results, Cardiac Rhythm NSR and Alarm Parameters  was reviewed with the receiving nurse. Informed about patient's being markell overnight with pauses. Heparin and Nitro drip verified. 0800: Administered scheduled meds. Singh ABERNATHY at bedside. Family at bedside. 0930: Dr. Ramona Byrnes at bedside. Received transfer orders for patient to [facility]. Rate change on heparin with bolus. Heparin and Nitro drip discontinued. NPO orders discontinued. Family at bedside and updated. 1000: Assessment completed. No complaints of pain or SOB. Patient in bed resting. 1235: Patient in bed watching tv, NAD.     1439: Received call from [facility] about patient's mental status. 1503: Received call from transport with patient's room assignment. Patient will be transported with cardiac monitor and O2.     1530: Transport arrived for patient.

## 2019-09-23 NOTE — DISCHARGE SUMMARY
[facility]  TRANSFER Summary       Patient: Demond Castro Age: 64 y.o. : 1960 MR#: [id number]: [id number]  PCP on record: Kirill Ackerman MD  Admit date: 2019  Discharge date: 2019    Disposition:    []Home   []Home with Home Health   []SNF/NH   []Rehab   []Home with family   [x]Alternate Facility: TRANSFER TO [address] [place] [address] / BED [id number]    Admission Diagnoses:  NSTEMI (non-ST elevated myocardial infarction) ([facility] [address] [I21.4]    Discharge Diagnoses:                             1. NSTEMI. 2.  Acute on chronic systolic and diastolic heart failure   3. Acute bronchitis   4. Hypoxia; suspect multifactorial. now resoled. 5. Anterior wall MI since 2019 with exertional angina for several months with moderately severe LV dysfunction on echo 2019 with EF 30-35%. 6. Aortic stenosis moderately severe to severe with mean gradient of 34 mmHg across the aortic valve on 2019.  7. Hypertensive heart disease. 8. HLD : with statin allergy. 9. GERD: PPI  10. Depression/Anxiety on lorazepam as needed. 11. Pacemaker present, h/o SSS   12. ?VAHE. OP f/u   13. DM2 (reported in records). hgbA1c 5.7 with hyperglycemia. Discharge Medications:     ASA 81 every day  Azithromycin 500mg every day X 5 days   Lasix 40mg every day  Lisinopril 20mg every day  Protonix 40mg every day    Consults:    - Cardiology     Procedures:  -   NONE    Significant Diagnostic Studies:   -  Reason for Exam   Priority: Routine   Heart Failure, Left; LV function s/p AWMI and aortic stenosis   Comments: Please do tomorrow AM prior to 9 AM to evaluate LV function and aortic stenosis before cardiac cath. Vitals     Weight Height BSA (calculated - sq m) BP Pulse (Heart Rate)   106.1 kg (234 lb) 6' 3\" (1.905 m) 2.37 sq meters 122/67    Interpretation Summary     Result status: Final result   · Left Ventricle: Normal wall thickness. Dilated left ventricle. Severe systolic dysfunction. Estimated left ventricular ejection fraction is 31 - 35%. Biplane method used to measure ejection fraction. Abnormal left ventricular wall motion. Abnormal left ventricular strain. Severe (grade 3) left ventricular diastolic dysfunction. · Aortic Valve: Aortic valve leaflet calcification present. The right and left leaflet appear to be fused with reduced mobility. Aortic valve mean gradient is 34.3 mmHg. Aortic valve area is 0.6 cm2. Severe aortic valve stenosis is present. Mild aortic valve regurgitation is present. · Left Atrium: Mildly dilated left atrium. · Mitral Valve: Mitral annular calcification. Mild mitral valve regurgitation is present. · Aorta: Mild aortic root dilatation. · Right Atrium: Dilated right atrium. · Pulmonary Artery: Pulmonary arterial systolic pressure is 29 mmHg. · Pericardium: Trivial pericardial effusion adjacent to right atrium. Hospital Course by Problem   HPI per admitting MD  Héctorbeckie Chavirapatricia is a 64 y.o. male with medical co-morbidities including HTN, SSS with PPM,  DM, hyperlipidemia, VAHE, presented to ER yesterday with worsened shortness of breath and chest discomfort with feeling of passing out. He has non-productive cough and chest congestion over the last week, and recently saw PCP with treatment of steroid and albuterol inhaler on Friday. Last night, his symptom worsened with associated diaphoresis and chest pressure, right shoulder pain. [facility] was [facility] in [place] where chest xray showed pulmonary congestion, elevated troponin, elevated proBNP. ABG showed pO2 76. He was started on Hep gtt, lasix IV 40mg, Nitropaste. Vancomycin and Cefepime was given, blood culture were done as well. Currently, he has cough with improved in his symptom. No history of lung disease, has leg swelling bilaterally in the last weeks. No recent long distance travel. No sick contact. \"    He was admitted for treatment and eval of NSTEMI. His troponin peaked at 1.56. Echo showed Left Ventricle: Normal wall thickness. Dilated left ventricle. Severe systolic dysfunction. Estimated left ventricular ejection fraction is 31 - 35%. Abnormal left ventricular wall motion. Abnormal left ventricular strain. Severe (grade 3) left ventricular diastolic dysfunction. Severe aortic valve stenosis, mildly dilated left atrium, Mitral annular calcification,  Mild mitral valve regurgitation, Mild aortic root dilatation, Dilated right atrium, Pulmonary arterial systolic pressure is 29 mmHg, and Pericardium: Trivial pericardial effusion adjacent to right atrium. Cardiology at this time rec transfer to [facility] when bed available for CV management, Cath and possible TAVAR. hep and nitor gtt were discontinued. Cont asa,  Cardiology to start PCSK9 inhibitor since he can't take statins. His acute HF was also managed this admission. His Pro BNP on admission was over 3K, he received lasix 40 BID. Diuresed -3L. Repeat . Cardiology transitioned his IV lasix to oral. He was noted comfortably Laying flat without issues. No edema. NAD. Labs/ exam reassuring for transfer to [facility].        Today's examination of the patient revealed:     Subjective:   Alert and oriented X 3. NAD. Wife at bedside. Free of chest pain. No chest discomfort. No n/v/d/c or abd pain.     Objective:   VS:   Visit Vitals  /65   Pulse 74   Temp 98.3 °F (36.8 °C)   Resp 21   Ht 6' 3\" (1.905 m)   Wt 106.1 kg (234 lb)   SpO2 94%   BMI 29.25 kg/m²      Tmax/24hrs: Temp (24hrs), Av.1 °F (36.7 °C), Min:97.5 °F (36.4 °C), Max:98.3 °F (36.8 °C)     Input/Output:     Intake/Output Summary (Last 24 hours) at 2019 1711  Last data filed at 2019 1039  Gross per 24 hour   Intake 560.03 ml   Output 3150 ml   Net -2589.97 ml       General:  Alert, NAD  Cardiovascular:  RRR  Pulmonary:  LSC throughout; respiratory effort WNL  GI:  +BS in all four quadrants, soft, non-tender  Extremities:  No edema; 2+ dorsalis pedis pulses bilaterally  Neuro: alert and oriented X 3. Labs:    Recent Results (from the past 24 hour(s))   PTT    Collection Time: 09/22/19  7:20 PM   Result Value Ref Range    aPTT 35.4 23.0 - 36.4 SEC   GLUCOSE, POC    Collection Time: 09/22/19 10:05 PM   Result Value Ref Range    Glucose (POC) 105 70 - 110 mg/dL   CBC WITH AUTOMATED DIFF    Collection Time: 09/23/19  6:00 AM   Result Value Ref Range    WBC 7.5 4.6 - 13.2 K/uL    RBC 4.50 (L) 4.70 - 5.50 M/uL    HGB 14.6 13.0 - 16.0 g/dL    HCT 43.2 36.0 - 48.0 %    MCV 96.0 74.0 - 97.0 FL    MCH 32.4 24.0 - 34.0 PG    MCHC 33.8 31.0 - 37.0 g/dL    RDW 13.3 11.6 - 14.5 %    PLATELET 801 114 - 133 K/uL    MPV 9.7 9.2 - 11.8 FL    NEUTROPHILS 62 40 - 73 %    LYMPHOCYTES 29 21 - 52 %    MONOCYTES 6 3 - 10 %    EOSINOPHILS 2 0 - 5 %    BASOPHILS 1 0 - 2 %    ABS. NEUTROPHILS 4.6 1.8 - 8.0 K/UL    ABS. LYMPHOCYTES 2.2 0.9 - 3.6 K/UL    ABS. MONOCYTES 0.5 0.05 - 1.2 K/UL    ABS. EOSINOPHILS 0.2 0.0 - 0.4 K/UL    ABS.  BASOPHILS 0.0 0.0 - 0.1 K/UL    DF AUTOMATED     METABOLIC PANEL, BASIC    Collection Time: 09/23/19  6:00 AM   Result Value Ref Range    Sodium 138 136 - 145 mmol/L    Potassium 3.5 3.5 - 5.5 mmol/L    Chloride 102 100 - 111 mmol/L    CO2 31 21 - 32 mmol/L    Anion gap 5 3.0 - 18 mmol/L    Glucose 119 (H) 74 - 99 mg/dL    BUN 18 7.0 - 18 MG/DL    Creatinine 0.92 0.6 - 1.3 MG/DL    BUN/Creatinine ratio 20 12 - 20      GFR est AA >60 >60 ml/min/1.73m2    GFR est non-AA >60 >60 ml/min/1.73m2    Calcium 8.4 (L) 8.5 - 10.1 MG/DL   NT-PRO BNP    Collection Time: 09/23/19  6:00 AM   Result Value Ref Range    NT pro- 0 - 900 PG/ML   PTT    Collection Time: 09/23/19  6:00 AM   Result Value Ref Range    aPTT 41.7 (H) 23.0 - 36.4 SEC   GLUCOSE, POC    Collection Time: 09/23/19  8:03 AM   Result Value Ref Range    Glucose (POC) 134 (H) 70 - 110 mg/dL   ECHO ADULT COMPLETE    Collection Time: 09/23/19  9:10 AM   Result Value Ref Range    LA Volume 93.45 18 - 58 mL    LV E' Lateral Velocity 4.55 cm/s    LV E' Septal Velocity 6.59 cm/s    Tapse 2.13 (A) 1.5 - 2.0 cm    Ao Root D 3.79 cm    Aortic Valve Systolic Peak Velocity 316.35 cm/s    AoV VTI 82.33 cm    Aortic Valve Area by Continuity of Peak Velocity 0.6 cm2    Aortic Valve Area by Continuity of VTI 0.6 cm2    AoV PG 54.9 mmHg    LVIDd 5.75 4.2 - 5.9 cm    LVPWd 0.92 0.6 - 1.0 cm    LVIDs 4.66 cm    IVSd 0.74 0.6 - 1.0 cm    LV ED Vol A2C 180.6 mL    LV ES Vol A4C 131.3 mL    LV ES Vol .9 (A) 22 - 58 mL    LVOT d 2.22 cm    LVOT Peak Velocity 53.20 cm/s    LVOT Peak Gradient 1.1 mmHg    LVOT VTI 11.75 cm    MV A Phi 40.08 cm/s    MV E Phi 72.59 cm/s    MV E/A 1.81     Aortic Valve Systolic Mean Gradient 44.9 mmHg    BP EF 32.5 (A) 55 - 100 %    LV Ejection Fraction MOD 4C 36 %    LV Ejection Fraction MOD 2C 30 %    Global Longitudinal Strain -8.40 %    LA Vol 4C 71.66 (A) 18 - 58 mL    LA Vol 2C 108.56 (A) 18 - 58 mL    LA Area 4C 23.5 cm2    LV Mass .8 88 - 224 g    LV Mass AL Index 90.3 49 - 115 g/m2    E/E' lateral 15.95     E/E' septal 11.02     E/E' ratio (averaged) 13.48     LV ES Vol A2C 126.6 mL    LVES Vol Index BP 55.4 mL/m2    LV ED Vol A4C 204.2 mL    LVED Vol Index BP 82.0 mL/m2    Mitral Valve E Wave Deceleration Time 121.5 ms    Triscuspid Valve Regurgitation Peak Gradient 25.8 mmHg    Aortic Regurgitant Pressure Half-time 443.1 cm    LV ED Vol .4 (A) 67 - 155 ml    TR Max Velocity 253.95 cm/s    LA Vol Index 39.84 16 - 28 ml/m2    LA Vol Index 46.28 16 - 28 ml/m2    LA Vol Index 30.55 16 - 28 ml/m2    LVED Vol Index A4C 87.0 mL/m2    LVED Vol Index A2C 77.0 mL/m2    LVES Vol Index A4C 56.0 mL/m2    LVES Vol Index A2C 54.0 mL/m2    DIEGO/BSA Pk Phi 0.2 cm2/m2    DIEGO/BSA VTI 0.2 cm2/m2    AR Max Phi 439.74 cm/s   GLUCOSE, POC    Collection Time: 09/23/19 11:50 AM   Result Value Ref Range    Glucose (POC) 142 (H) 70 - 110 mg/dL       DIET:  Regular Diet and Cardiac Diet      ACTIVITY: Activity as tolerated  Patient needs to be on Fall, aspiration, decubitus precaution.       ADDITIONAL INFORMATION: If you experience any of the following symptoms but not limited to Fever, chills, nausea, vomiting, diarrhea, change in mentation, falling, bleeding, shortness of breath, chest pain, please call your primary care physician or return to the emergency room if you cannot get hold of your doctor:     Condition: Stable for Transfer to [facility] for further CV eval and treatment  Follow-up Appointments:   Per Hospitalist team at [facility].       >30 minutes spent coordinating this discharge (review instructions/follow-up, prescriptions, preparing report for sign off)    Signed:  Rubia Soto NP  9/23/2019  5:11 PM

## 2019-09-23 NOTE — PROGRESS NOTES
[facility]  -  Progress Note      Patient: Kristian Morocho MRN: [id number]  SSN: [id number]    YOB: 1960  Age: 64 y.o. Sex: male      Admit Date: 9/22/2019    Assessment:     1. Acute on chronic systolic and diastolic heart failure in the setting of # 2  2. Anterior wall MI since March of 2019 with exertional angina for several months with moderately severe LV dysfunction on echo 9/23/2019 with EF 30-35%. 3. Aortic stenosis moderately severe to severe with mean gradient of 34 mmHg across the aortic valve on 9/23/2019. 4. Hypertensive heart disease. 5. History of vasodepressor syncope with strong cardioinhibitory component s/p dual chamber [employer] pacemaker on 1/20/2011. 6. Hyperlipidemia with intolerance to statins. 7. Untreated obstructive sleep apnea. 8. GERD     Plan:     1. DC Heparin and IV NTG drip. 2. DC IV Lasix and switch to oral Lasix. 3. Will transfer to the [facility] when the bed is ready to Dr. Jeannie Celaya service for further CV work up and management of coronary artery disease with recent MI and possible TAVR. Subjective:     No new complaints. Breathing comfortably. No further chest pressure. His troponin's trending down from 1.56 to 1.35 yesterday at 1551 and NT pro-BNP down form 3084 to 835 this AM. I discussed his case with Dr. Wen Thornton and in view of severe aortic stenosis and need for valve and patient would prefer TAVR will simply plan to transfer to the [facility] for full cardiac work up and further management. Objective:      Echocardiogram today on 9/23/2019:    · Left Ventricle: Normal wall thickness. Dilated left ventricle. Severe systolic dysfunction. Estimated left ventricular ejection fraction is 31 - 35%. Biplane method used to measure ejection fraction. Abnormal left ventricular wall motion. Abnormal left ventricular strain. Moderate (grade 2) left ventricular diastolic dysfunction.   · Aortic Valve: Aortic valve leaflet calcification present. The right and left leaflet appear to be fused with reduced mobility. Aortic valve mean gradient is 34.3 mmHg. Aortic valve area is 0.6 cm2. Moderately severe to severe aortic valve stenosis is present. Mild aortic valve regurgitation is present. · Mitral Valve: Mitral annular calcification. Mild mitral valve regurgitation is present. · Left Atrium: Mildly dilated left atrium. · Aorta: Mild aortic root dilatation. · Right Atrium: Dilated right atrium. · Pulmonary Artery: Pulmonary arterial systolic pressure is 29 mmHg. · Pericardium: Trivial pericardial effusion adjacent to right atrium. Patient Vitals for the past 8 hrs:   Temp Pulse Resp BP SpO2   09/23/19 0858     93 %   09/23/19 0817    122/67    09/23/19 0737 98.3 °F (36.8 °C) 71 21 122/67 96 %   09/23/19 0630    124/65    09/23/19 0500  (!) 58 11 98/69 94 %   09/23/19 0400 98.1 °F (36.7 °C) 65 19 98/77 91 %   09/23/19 0300  61 20 98/75 93 %   09/23/19 0200 97.5 °F (36.4 °C) 61 18 94/74 95 %         Patient Vitals for the past 96 hrs:   Weight   09/23/19 0817 106.1 kg (234 lb)   09/23/19 0515 103.6 kg (228 lb 8 oz)   09/22/19 1219 107.4 kg (236 lb 11.2 oz)         Intake/Output Summary (Last 24 hours) at 9/23/2019 0916  Last data filed at 9/22/2019 1820  Gross per 24 hour   Intake 600 ml   Output 1850 ml   Net -1250 ml       Physical Exam:  General:  alert, cooperative, no distress, appears stated age  Neck:  No JVD. Bilateral carotid bruits L>R vs radiation of heart murmur to the neck  Lungs:  clear to auscultation bilaterally, except few rales in right base. Heart:  regular rate and rhythm, S1 normal with decrease A2 or S2.  There is a grade III/VI ALVA harsh mid to late peaking ALVA heard loudest at the base, but also along the LSB.  No  click, rub or gallop  Abdomen:  abdomen is soft without significant tenderness, masses, organomegaly or guarding  Extremities:  extremities normal, atraumatic, no cyanosis or edema    Data Review:     Labs: Results:       Chemistry Recent Labs     09/23/19  0600 09/22/19  1000   * 116*    142   K 3.5 3.7    106   CO2 31 29   BUN 18 20*   CREA 0.92 0.84   CA 8.4* 8.1*   AGAP 5 7   BUCR 20 24*   AP  --  74   TP  --  5.8*   ALB  --  3.3*   GLOB  --  2.5   AGRAT  --  1.3      CBC w/Diff Recent Labs     09/23/19  0600 09/22/19  1000   WBC 7.5 8.9   RBC 4.50* 4.21*   HGB 14.6 13.8   HCT 43.2 40.1    232   GRANS 62  --    LYMPH 29  --    EOS 2  --       Cardiac Enzymes Lab Results   Component Value Date/Time     09/22/2019 10:00 AM    CKMB 6.8 (H) 09/22/2019 10:00 AM    CKND1 2.7 09/22/2019 10:00 AM    TROIQ 1.35 (H) 09/22/2019 03:51 PM    TROIQ 1.56 (HH) 09/22/2019 10:00 AM      Coagulation Recent Labs     09/23/19  0600 09/22/19  1920 09/22/19  1000   PTP  --   --  13.8   INR  --   --  1.1   APTT 41.7* 35.4 32.2       Lipid Panel Lab Results   Component Value Date/Time    Cholesterol, total 224 (H) 09/22/2019 10:00 AM    HDL Cholesterol 35 (L) 09/22/2019 10:00 AM    LDL, calculated 162.6 (H) 09/22/2019 10:00 AM    VLDL, calculated 26.4 09/22/2019 10:00 AM    Triglyceride 132 09/22/2019 10:00 AM    CHOL/HDL Ratio 6.4 (H) 09/22/2019 10:00 AM      BNP No results found for: BNP, BNPP, XBNPT   Liver Enzymes Recent Labs     09/22/19  1000   TP 5.8*   ALB 3.3*   AP 74   SGOT 22      Digoxin    Thyroid Studies Lab Results   Component Value Date/Time    TSH 1.89 09/22/2019 10:00 AM          Lydia Jordan DO   September 23, 2019

## 2019-09-23 NOTE — PROGRESS NOTES
Respiratory Care Assessment for Bronchial hygiene or Lung Expansion Therapy  Patient  Cherelle Zambrano     64 y.o.   male     9/23/2019  1:15 PM  Patient Active Problem List   Diagnosis Code    GERD (gastroesophageal reflux disease) K21.9    Hyperlipidemia E78.5    Obstructive sleep apnea G47.33    SSS (sick sinus syndrome) ([facility] I49.5    Diverticular disease K57.90    Pacemaker Z95.0    Hypertensive heart disease  I11.9    Aortic stenosis, mild I35.0    Vasodepressor syncope R55    NSTEMI (non-ST elevated myocardial infarction) ([facility] I21.4    Pulmonary edema J81.1    Hypoxia R09.02    Acute bronchitis J20.9       ABG:  Date:9/23/2019  No results found for: PH, PHI, PCO2, PCO2I, PO2, PO2I, HCO3, HCO3I, FIO2, FIO2I    Chest X-ray:  Date:9/23/2019  Results from Hospital Encounter encounter on 09/22/19   XR CHEST PA LAT    Narrative PROCEDURE: Chest PA and lateral radiograph     CPT Code: 72963    History: Chest pain. Comparison: 1/22/2011. Findings:    Left subclavian dual-lead pacemaker with right atrial and right ventricular  leads. No acute skeletal findings. There are no pleural effusions. The heart  and pulmonary vasculature is normal.  There is no hilar or mediastinal  lymphadenopathy. There is no pulmonary edema, pneumothorax or focal lung  opacity. .       Impression IMPRESSION:    1.  Stable chest. No acute cardiopulmonary disease.        Lab Test:  Date:9/23/2019  WBC:   Lab Results   Component Value Date/Time    WBC 7.5 09/23/2019 06:00 AM   HGB:   Lab Results   Component Value Date/Time    HGB 14.6 09/23/2019 06:00 AM    PLTS:   Lab Results   Component Value Date/Time    PLATELET 349 26/91/4318 06:00 AM       SaO2%/flow: @LASTSAO2(1)@    Vital Signs:     Patient Vitals for the past 8 hrs:   Temp Pulse Resp BP SpO2   09/23/19 1229 98.3 °F (36.8 °C) 74 21 124/65 94 %   09/23/19 1228  73 23  93 %   09/23/19 1227  72 26  93 %   09/23/19 1226  83 19  93 %   09/23/19 1225  81 25  92 % 09/23/19 1224  85 20  94 %   09/23/19 1223  79 22  91 %   09/23/19 1222  72 20  94 %   09/23/19 1221  81 17  92 %   09/23/19 1220  85 21  93 %   09/23/19 1219  81 18  93 %   09/23/19 1218  84 23  90 %   09/23/19 1217  83 19  94 %   09/23/19 1216  86 18  94 %   09/23/19 1215  86 20  93 %   09/23/19 1214  80 20  93 %   09/23/19 1213  84 27  91 %   09/23/19 1212  74 18  93 %   09/23/19 1211  67 21  94 %   09/23/19 1210  66 22  93 %   09/23/19 1209  67 22  94 %   09/23/19 1208  65 22  93 %   09/23/19 1207  65 21  93 %   09/23/19 1206  66 24  93 %   09/23/19 1205  73 16  93 %   09/23/19 1204  80 14  95 %   09/23/19 1154  73 15  96 %   09/23/19 1153  68 23  94 %   09/23/19 1152  71 16  95 %   09/23/19 1151  71 20  94 %   09/23/19 1150  82 16  92 %   09/23/19 1149  72 16  94 %   09/23/19 1148  74 18  93 %   09/23/19 1147  75 19  94 %   09/23/19 1146  69 12  94 %   09/23/19 1145  79 16  91 %   09/23/19 1144  63 17  91 %   09/23/19 1143  64 18  90 %   09/23/19 1142  67 18  91 %   09/23/19 1141  67 21  91 %   09/23/19 1140  68 22  91 %   09/23/19 1139  66 21  91 %   09/23/19 1138  65 22  91 %   09/23/19 1137  69 21  92 %   09/23/19 1136  68 17  93 %   09/23/19 1135  71 15  95 %   09/23/19 1134  74 18  93 %   09/23/19 1133  71 19  93 %   09/23/19 1132  73 21  93 %   09/23/19 1131  68 21  93 %   09/23/19 1130  71 20  92 %   09/23/19 1129  74 18  94 %   09/23/19 1128  74 18  94 %   09/23/19 1127  77 20  94 %   09/23/19 1126  66 23  92 %   09/23/19 1125  80 21  90 %   09/23/19 1124  65 20  (!) 89 %   09/23/19 1123  64 20  (!) 89 %   09/23/19 1122  65 19  90 %   09/23/19 1121  63 19  91 %   09/23/19 1120  64 20  90 %   09/23/19 1119  65 17  91 %   09/23/19 1118  66 22  (!) 89 %   09/23/19 1117  64 19  90 %   09/23/19 1116  63 19  91 %   09/23/19 1115  66 20  92 %   09/23/19 1114  67 22  92 %   09/23/19 1113  68 21  92 %   09/23/19 1112  67 21  91 %   09/23/19 1111  68 22  92 %   09/23/19 1110  66 17  94 %   09/23/19 1109  70 20  94 %   09/23/19 1108  71 18  93 %   09/23/19 1107  78 19  94 %   09/23/19 1106  74 17  91 %   09/23/19 1105  69 19  92 %   09/23/19 1104  74 17  93 %   09/23/19 1103  72 18  95 %   09/23/19 1102  69 15  93 %   09/23/19 1101  75 16  92 %   09/23/19 1100  68 18  92 %   09/23/19 1059  74 18  93 %   09/23/19 1058  77 19  93 %   09/23/19 1057  74 19  94 %   09/23/19 1056  78 17  93 %   09/23/19 1055  82 13  93 %   09/23/19 1054  76 19  94 %   09/23/19 1053  76 19  93 %   09/23/19 1052  81 23  94 %   09/23/19 1051  74 22  93 %   09/23/19 1050  82 17  93 %   09/23/19 1049  83 23  94 %   09/23/19 1048  85 22  94 %   09/23/19 1047  79 19  94 %   09/23/19 1046  84 13  96 %   09/23/19 1045  87 19  96 %   09/23/19 1044  86 22  93 %   09/23/19 1043  82 19  93 %   09/23/19 1042  75 21  93 %   09/23/19 1041  82 19  92 %   09/23/19 1040  89 22  92 %   09/23/19 1039  79 19  93 %   09/23/19 1038  78 20  95 %   09/23/19 1037  72 17  94 %   09/23/19 1036  69 17  91 %   09/23/19 1035  69 20  91 %   09/23/19 1034  75 22  92 %   09/23/19 1033  80 21  92 %   09/23/19 1032  70 19  92 %   09/23/19 1031  72 22  93 %   09/23/19 1030  90 24  92 %   09/23/19 1029  76 24  94 %   09/23/19 1028  73 19  92 %   09/23/19 1027  75 20  92 %   09/23/19 1026  77 21  93 %   09/23/19 1025  71 20  92 %   09/23/19 1024  68 19  93 %   09/23/19 1023  74 19  92 %   09/23/19 1022  72 20  92 %   09/23/19 1021  80 23  93 %   09/23/19 1020  73 18  90 %   09/23/19 1019  72 20  91 %   09/23/19 1018  72 20  92 %   09/23/19 1017  80 18  91 %   09/23/19 1016  72 19  90 %   09/23/19 1015  72 19  92 %   09/23/19 1014  73 19  94 %   09/23/19 1013  77 18  92 %   09/23/19 1012  75 14  92 %   09/23/19 1011  78 25  98 %   09/23/19 1010  84 18  96 %   09/23/19 1009  78 17  91 %   09/23/19 1008  73 19  92 %   09/23/19 1007  72 23  94 %   09/23/19 1006  79 22  91 %   09/23/19 1005  75 13  94 %   09/23/19 1004  75 12  96 %   09/23/19 1003  81 17  94 %   09/23/19 1002  85 16  96 %   09/23/19 1001  78 12  95 %   09/23/19 1000  78 12  96 %   09/23/19 0959  82 19  93 %   09/23/19 0958  78 16  92 %   09/23/19 0957  79 14  94 %   09/23/19 0956  82 19  94 %   09/23/19 0950     94 %   09/23/19 0949  94 21  94 %   09/23/19 0948  86 22  92 %   09/23/19 0947  86 16  91 %   09/23/19 0946  75 18  96 %   09/23/19 0945  86 16  96 %   09/23/19 0944  67 10  94 %   09/23/19 0943  83 16  93 %   09/23/19 0942  80 17  95 %   09/23/19 0941  77 28  93 %   09/23/19 0940  78 22  92 %   09/23/19 0939  79 18  92 %   09/23/19 0938  76 20  95 %   09/23/19 0937  76 11  94 %   09/23/19 0936  75 18  94 %   09/23/19 0935  80 15  95 %   09/23/19 0934  78 15  94 %   09/23/19 0933  77 20  95 %   09/23/19 0932  73 26  95 %   09/23/19 0931  81 19  95 %   09/23/19 0930  85 21  96 %   09/23/19 0929  89 30  95 %   09/23/19 0928  69 22  91 %   09/23/19 0927  70 15  94 %   09/23/19 0926  74 13  93 %   09/23/19 0925  77 18  94 %   09/23/19 0924  75 17  91 %   09/23/19 0923  73 15  93 %   09/23/19 0922  74 18  97 %   09/23/19 0921  60 15  94 %   09/23/19 0920  77 16  95 %   09/23/19 0919  72 18  95 %   09/23/19 0918  72 15  91 %   09/23/19 0917  72 10  91 %   09/23/19 0916  65 14  94 %   09/23/19 0915  75 13  96 %   09/23/19 0914  79 17  96 %   09/23/19 0913  80 12  97 %   09/23/19 0912  73 9  98 %   09/23/19 0911  73 (!) 7  98 %   09/23/19 0910  69 9  98 %   09/23/19 0909  72 13  98 %   09/23/19 0908  67 10  96 %   09/23/19 0907  63 10  97 %   09/23/19 0906  74 13  98 %   09/23/19 0905  66 9  97 %   09/23/19 0904  70 12  98 %   09/23/19 0903  71 9  97 %   09/23/19 0902  67 10  97 %   09/23/19 0901  (!) 58 10  97 %   09/23/19 0900  69 21  92 %   09/23/19 0859  70 15  94 %   09/23/19 0858  82 24  93 %   09/23/19 0857  80 19  92 %   09/23/19 0856  75 15  93 %   09/23/19 0855  72 19  92 %   09/23/19 0854  81 24  (!) 89 %   09/23/19 0853  67 19  94 %   09/23/19 0852  61 20  95 %   09/23/19 0851  70 25  95 %   09/23/19 0850  63 22  92 %   09/23/19 0849  61 20  93 %   09/23/19 0848  (!) 59 14  93 %   09/23/19 0847  68 17  93 %   09/23/19 0846  61 12  93 %   09/23/19 0845  66 14  93 %   09/23/19 0844  62 8  93 %   09/23/19 0843  68 18  93 %   09/23/19 0842  67 10  93 %   09/23/19 0841  (!) 59 9  93 %   09/23/19 0840  63 22  94 %   09/23/19 0839  61 24  94 %   09/23/19 0838  67 22  94 %   09/23/19 0837  68 17  94 %   09/23/19 0836  63 17  93 %   09/23/19 0835  71 15  95 %   09/23/19 0834  63 18  95 %   09/23/19 0833  66 17  95 %   09/23/19 0832  70 16  96 %   09/23/19 0817    122/67    09/23/19 0816  75 20  96 %   09/23/19 0815  79 19  94 %   09/23/19 0814  73 19  96 %   09/23/19 0813  70 15  97 %   09/23/19 0812  74 14  96 %   09/23/19 0811  71 18  97 %   09/23/19 0810  73 13  94 %   09/23/19 0809  71 16  93 %   09/23/19 0808  68 20  96 %   09/23/19 0807  72 19  96 %   09/23/19 0806  67 18  96 %   09/23/19 0805  67 18  96 %   09/23/19 0804  68 18  97 %   09/23/19 0803  70 17 110/77 96 %   09/23/19 0802  72 18  95 %   09/23/19 0801  72 22  94 %   09/23/19 0800  68 21 (!) 124/105 95 %   09/23/19 0737 98.3 °F (36.8 °C) 71 21 122/67 96 %   09/23/19 0630    124/65          RA/O2 flow/device: Sp02 94% on RA        First Inital Assesment:     []Yes []No   Reevaluation/Reassessment:    []Yes []No     CHART REVIEW   Points 0 X 1 X 2 X 3 X 4 X Points   Pulmonary History Smoking History (1) none  Recent Smoking History <1 PPD  Recent Smoking History >1 PPD  Pulmonary Disease or Impairment Severe Pulmonary Disease  0   Surgical History No Surgery  General Surgery  Lower Abdominal  Thoracic or Upper Abdominal  Thoracic & Pulmonary Disease  0   CXR Clear or not indicated  Chronic changes or CXR Pending  Infiltrate, atelectasis or pleural effusions  Infiltrates in more than 1lobe  Infiltrates +atelectasis  +/or pleural effusions  0     PATIENT ASSESSMENT    0 X 1 X 2 X 3 X 4 X Points   Respiratory Pattern Regular pattern RR 12-20  Increased RR 21-27   Mild Dyspnea at rest, irregular pattern RR 28-32  Moderate Dyspnea at rest, Use of accessory muscles, RR 33-36  Severe Dyspnea, Use of accessory muscles RR >36  0   Mental Status Alert Oriented cooperative  Confused, Follows commands  Lethargic, Does not follow commands  Obtunded  Unresponsive  0   Breath Sounds Clear  Decreased Unilaterally  Decreased Bilaterally  Mild Scattered wheezing or Crackles in bases  Severe Wheezing or rhonchi  2   Cough Strong dry NPC  Strong Productive  Weak NPC  Weak productive or weak with rhonchi  No cough or may require suctioning  0   Level of Activity Ambulatory  Ambulatory with assistance  Temporarily Non-ambulatory  Non-Ambulatory, able to position self  Unable to position self, confined to bed  0   Total Points/Score:   2     Specific Intervention Chart()    Bronchial Hygiene/Secretion Clearance:    []EZPAP []Rotation bed with vibration    []CPT with percussor []CPT via vest   []Oscillastiang positive pressure expiratory device      Lung Expansion:    []Incentive Spirometer w/RT visits []Incentive Spirometer w/nursing    []EZPAP      *Suctioning:    []Nasal Tracheal []Tracheal     *suctioning will be ordered and done PRN with an associated frequency such as QID/PRN based on score       Other:    Care Plan   Level # Score Modality Frequency Comment   Level 1 >17 - -    Level 2 14-17 - -    Level 3 10-13 - -    Level 4 1-9 I/S Q2 Patient educated to do I/S on his own      [employer] Frequency Indications/Findings Level #   Q4 ATC Copious secretions, SOB, unable to sleep 1   QID & PRN at night Moderate amounts of secretions 2   TID or Q6 wa Small amounts of secretions and poor cough: recent history of secretions 3   BID or Q8 wa Unable to deep breathe and cough effectively 4        Comments:  Patient has no hx of respiratory disease.  Non-smoker, clear CXR    Respiratory Therapist: Iris Cline, RT

## 2019-09-23 NOTE — PROGRESS NOTES
TRANSFER - OUT REPORT:    Verbal report given to Meet Katherine Crabtree (name) on Emile Contreras  being transferred to [place] [address] (unit) for urgent transfer       Report consisted of patients Situation, Background, Assessment and   Recommendations(SBAR). Information from the following report(s) SBAR, Kardex, Procedure Summary, Intake/Output, MAR, Recent Results and Alarm Parameters  was reviewed with the receiving nurse. Lines:   Peripheral IV Anterior;Right Forearm (Active)   Site Assessment Clean, dry, & intact 9/23/2019 10:15 AM   Phlebitis Assessment 0 9/23/2019 10:15 AM   Infiltration Assessment 0 9/23/2019 10:15 AM   Dressing Status Clean, dry, & intact 9/23/2019 10:15 AM   Dressing Type Transparent;Tape 9/23/2019 10:15 AM   Hub Color/Line Status Pink;Flushed;Capped 9/23/2019 10:15 AM   Action Taken Open ports on tubing capped 9/23/2019 10:15 AM   Alcohol Cap Used Yes 9/23/2019 10:15 AM       Peripheral IV Anterior; Left Forearm (Active)   Site Assessment Clean, dry, & intact 9/23/2019 10:15 AM   Phlebitis Assessment 0 9/23/2019 10:15 AM   Infiltration Assessment 0 9/23/2019 10:15 AM   Dressing Status Clean, dry, & intact 9/23/2019 10:15 AM   Dressing Type Transparent;Tape 9/23/2019 10:15 AM   Hub Color/Line Status Pink 9/23/2019 10:15 AM   Action Taken Open ports on tubing capped 9/23/2019 10:15 AM   Alcohol Cap Used Yes 9/23/2019 10:15 AM        Opportunity for questions and clarification was provided.       Patient transported with:   Monitor

## 2019-09-23 NOTE — PROGRESS NOTES
Problem: Falls - Risk of  Goal: *Absence of Falls  Description  Document Ayden Peck Fall Risk and appropriate interventions in the flowsheet.   Outcome: Progressing Towards Goal  Note:   Fall Risk Interventions:  Mobility Interventions: Communicate number of staff needed for ambulation/transfer, Bed/chair exit alarm         Medication Interventions: Assess postural VS orthostatic hypotension         History of Falls Interventions: Bed/chair exit alarm, Consult care management for discharge planning, Door open when patient unattended         Problem: Patient Education: Go to Patient Education Activity  Goal: Patient/Family Education  Outcome: Progressing Towards Goal

## 2019-09-23 NOTE — PROGRESS NOTES
NUTRITION    Nursing Referral: [facility]  Nutrition Consult: General Nutrition Management & Supplements     RECOMMENDATIONS / PLAN:     - Continue current nutrition interventions. - Continue RD inpatient monitoring and evaluation. NUTRITION INTERVENTIONS & DIAGNOSIS:     [x] Meals/snacks: modified composition  [x] Nutrition counseling/education: cardiac diet education 923/19    Nutrition Diagnosis: Limited adherence to nutrition related recommendations related to prescribed cardiac diet due to NSTEMI, heart failure as evidenced by patient admits to consuming high sodium convenience foods. ASSESSMENT:     Tolerating diet with good appetite, consuming most of recent meals. Provided cardiac diet education. Noted plan for transfer to [place] [address].     Average po intake adequate to meet patients estimated nutritional needs:   [x] Yes     [] No   [] Unable to determine at this time    Diet: DIET CARDIAC Regular      Food Allergies: NKFA  Current Appetite:   [x] Good     [] Fair     [] Poor     [] Other:  Appetite/meal intake prior to admission:   [x] Good     [] Fair     [] Poor     [] Other:  Feeding Limitations:  [] Swallowing difficulty    [] Chewing difficulty    [] Other:  Current Meal Intake:   Patient Vitals for the past 100 hrs:   % Diet Eaten   09/22/19 1820 100 %   09/22/19 1312 95 %       BM: PTA  Skin Integrity: WDL  Edema:   [x] No     [] Yes   Pertinent Medications: Reviewed    Recent Labs     09/23/19  0600 09/22/19  1000    142   K 3.5 3.7    106   CO2 31 29   * 116*   BUN 18 20*   CREA 0.92 0.84   CA 8.4* 8.1*   ALB  --  3.3*   SGOT  --  22   ALT  --  22       Intake/Output Summary (Last 24 hours) at 9/23/2019 1539  Last data filed at 9/23/2019 1039  Gross per 24 hour   Intake 560.03 ml   Output 4050 ml   Net -3489.97 ml       Anthropometrics:  Ht Readings from Last 1 Encounters:   09/23/19 6' 3\" (1.905 m)     Last 3 Recorded Weights in this Encounter    09/22/19 1219 09/23/19 0515 09/23/19 2178   Weight: 107.4 kg (236 lb 11.2 oz) 103.6 kg (228 lb 8 oz) 106.1 kg (234 lb)     Body mass index is 29.25 kg/m². Overweight     Weight History: patient reports stable weight hx, no change in weight PTA; 1 lb weight loss over the past 6 months per chart review     Weight Metrics 9/23/2019 9/20/2019 3/13/2019 11/27/2017 5/16/2017 5/28/2015 10/6/2014   Weight 234 lb 234 lb 235 lb 245 lb 228 lb 231 lb 238 lb   BMI 29.25 kg/m2 29.25 kg/m2 29.37 kg/m2 30.62 kg/m2 28.5 kg/m2 28.87 kg/m2 29.75 kg/m2        Admitting Diagnosis: NSTEMI (non-ST elevated myocardial infarction) ([facility] [I21.4]  Pertinent PMHx: HTN, DM, HLD, VAHE, GERD     Education Needs:        [] None identified  [] Identified - Not appropriate at this time  [x]  Identified and addressed - refer to education log  Learning Limitations:   [x] None identified  [] Identified    Cultural, Jain & ethnic food preferences:  [x] None identified    [] Identified and addressed     ESTIMATED NUTRITION NEEDS:     Calories: 5260-6355 kcal (MSJx1.2-1.3) based on  [x] Actual  kg     [] IBW   Protein:  gm (0.8-1 gm/kg) based on  [x] Actual BW      [] IBW   Fluid: 1 mL/kcal     MONITORING & EVALUATION:     Nutrition Goal(s):   1. Po intake of meals will meet >75% of patient estimated nutritional needs within the next 7 days. Outcome:  [] Met/Ongoing    [] Progressing towards goal    [] Not progressing towards goal    [x] New/Initial goal     Monitoring:   [x] Food and beverage intake   [x] Diet order   [x] Nutrition-focused physical findings   [x] Treatment/therapy   [] Weight   [] Enteral nutrition intake        Previous Recommendations (for follow-up assessments only):     []   Implemented       []   Not Implemented (RD to address)      [] No Longer Appropriate     [] No Recommendation Made     Discharge Planning: No nutritional discharge needs at this time.    [x] Participated in care planning, discharge planning, & interdisciplinary rounds as appropriate      Idalmis Tijerina RD, [address] [place]    Pager: [phone]

## 2019-09-23 NOTE — PROGRESS NOTES
Reason for Admission:  NSTEMI (non-ST elevated myocardial infarction) ([facility] [I21.4]                 RRAT Score:    7           Plan for utilizing home health:    No orders at this time. Likelihood of Readmission:   LOW                                    Initial assessment completed with patient. Cognitive status of patient: oriented to time, place, person and situation. Face sheet information confirmed:  yes. The patient designates Anna Ibanez, spouse ([phone]) to participate in his discharge plan and to receive any needed information. This patient lives in a single family home with spouse. Patient is able to navigate steps as needed. Prior to hospitalization, patient was considered to be independent with ADLs/IADLS : yes . Patient has a current ACP document on file: no  The patient will need stretcher transport for transfer to [facility].   The patient  has no medical equipment available in the home. Patient is not currently active with home health. Patient has not stayed in a skilled nursing facility or rehab. This patient is on dialysis :no      Freedom of choice signed: no.     Currently, the discharge plan is Transfer to [facility].  Patient has been accepted by Dr. Fab Marr at \[facility]\"" 36 spoke with Brenda at [facility]. Per [facility], they are still waiting on a bed and will call [facility] when bed is available. CM will continue to monitor for transitional needs. The patient states that he can obtain his medications from the pharmacy, and take his medications as directed. Patient's current insurance is [employer]  PCP Verified by CM:  Yes  Last Visit to PCP: 09/20/19  Mode of Transport at Discharge: BLS  Transition of Care Consult (CM Consult): Discharge Planning, Other(Transfer to [facility])  [address] [place]: No  Discharge Durable Medical Equipment: No  Physical Therapy Consult: No  Occupational Therapy Consult: No  Speech Therapy Consult: No  Current Support Network: Lives with Spouse  Plan discussed with Pt/Family/Caregiver: Yes  Discharge Location  Discharge Placement: (Transfer to [facility])      JUSTIN EllisN, RN  Pager # [phone]  Care Manager

## 2019-09-23 NOTE — PROGRESS NOTES
ADULT PROTOCOL: JET AEROSOL ASSESSMENT    Patient  Emile Contreras     64 y.o.   male     9/23/2019  12:09 PM    Breath Sounds Pre Procedure:  Breath Sounds Bilateral: Clear, Diminished                                            Breath Sounds Post Procedure: Breath Sounds Bilateral: Clear, Diminished                                               Breathing pattern: Pre procedure  Breathing Pattern: Regular          Post procedure  Breathing Pattern: Regular    Cough: Pre procedure  Cough: Non-productive               Post procedure Cough: Non-productive    Heart Rate: Pre procedure Pulse: 67           Post procedure Pulse: 71    Resp Rate: Pre procedure  Respirations: 12           Post procedure          Nebulizer Therapy: Current medications Aerosolized Medications: Pulmicort       Problem List:   Patient Active Problem List   Diagnosis Code    GERD (gastroesophageal reflux disease) K21.9    Hyperlipidemia E78.5    Obstructive sleep apnea G47.33    SSS (sick sinus syndrome) ([facility]) I49.5    Diverticular disease K57.90    Pacemaker Z95.0    Hypertensive heart disease  I11.9    Aortic stenosis, mild I35.0    Vasodepressor syncope R55    NSTEMI (non-ST elevated myocardial infarction) ([facility]) I21.4    Pulmonary edema J81.1    Hypoxia R09.02    Acute bronchitis J20.9       Patient alert and cooperative to use MDI: YesYes    Home Respiratory Therapy Regimen/Frequency:  NO  Medication   Device  Frequency     SEVERITY INDEX:    ITEM 0 1 2 3 4 Score   Respiratory Pattern and or Rate Regular  10-19 Regular  20-24   24-30    30-34 Severe SOB or   Greater than 35 0   Breath Sounds Clear Occasional Wheeze Mild Wheezing Moderate Wheezing  wheezing/Absent breath sounds 0   Shortness of Breath None Dyspnea on Exertion Dyspnea at Rest Moderate Shortness of Breath at Rest Severe Shortness of Breath - Limited Speech 0       Total Score:  0    * Scoring Guidelines  0-4 pts:  PRN-BID   5-7 pts:  BID, TID, QID  8-9 pts: TID, QID, Q6  10-12 pts:  Q4-Q6  * - Guidelines used with clinical judgement. PRN Treatments can be ordered to supplement scheduled treatments. Regardless of score, frequency should not be less than normal home regimen. Recommended Order/Frequency:  Discontinue    Comments:  Patient has no history of COPD/Asthma. Patient is not SOB.         Respiratory Therapist: Marylou Gonzalez, RT

## 2019-11-12 ENCOUNTER — HOSPITAL ENCOUNTER (OUTPATIENT)
Dept: LAB | Age: 59
Discharge: HOME | End: 2019-11-12
Payer: COMMERCIAL

## 2019-11-12 ENCOUNTER — OFFICE VISIT (OUTPATIENT)
Dept: CARDIOLOGY CLINIC | Age: 59
End: 2019-11-12

## 2019-11-12 VITALS
HEART RATE: 52 BPM | OXYGEN SATURATION: 97 % | WEIGHT: 225 LBS | BODY MASS INDEX: 27.98 KG/M2 | HEIGHT: 75 IN | SYSTOLIC BLOOD PRESSURE: 122 MMHG | DIASTOLIC BLOOD PRESSURE: 88 MMHG

## 2019-11-12 DIAGNOSIS — Z95.0 PACEMAKER: ICD-10-CM

## 2019-11-12 DIAGNOSIS — I35.0 AORTIC STENOSIS, MILD: ICD-10-CM

## 2019-11-12 DIAGNOSIS — E78.5 HYPERLIPIDEMIA, UNSPECIFIED HYPERLIPIDEMIA TYPE: ICD-10-CM

## 2019-11-12 DIAGNOSIS — I49.5 SSS (SICK SINUS SYNDROME) (HCC): ICD-10-CM

## 2019-11-12 DIAGNOSIS — Z95.2 S/P AVR (AORTIC VALVE REPLACEMENT): ICD-10-CM

## 2019-11-12 DIAGNOSIS — Z95.1 HX OF CABG: ICD-10-CM

## 2019-11-12 DIAGNOSIS — R06.02 SHORTNESS OF BREATH: ICD-10-CM

## 2019-11-12 DIAGNOSIS — I11.9 BENIGN HYPERTENSIVE HEART DISEASE WITHOUT HEART FAILURE: ICD-10-CM

## 2019-11-12 DIAGNOSIS — I21.09 ANTERIOR WALL MYOCARDIAL INFARCTION (HCC): ICD-10-CM

## 2019-11-12 DIAGNOSIS — Z79.01 ENCOUNTER FOR CURRENT LONG-TERM USE OF ANTICOAGULANTS: Primary | ICD-10-CM

## 2019-11-12 LAB
ANION GAP SERPL CALC-SCNC: 5 MMOL/L (ref 3–18)
BASOPHILS # BLD: 0 K/UL (ref 0–0.1)
BASOPHILS NFR BLD: 0 % (ref 0–2)
BNP SERPL-MCNC: 1573 PG/ML (ref 0–900)
BUN SERPL-MCNC: 14 MG/DL (ref 7–18)
BUN/CREAT SERPL: 14 (ref 12–20)
CALCIUM SERPL-MCNC: 8.8 MG/DL (ref 8.5–10.1)
CHLORIDE SERPL-SCNC: 108 MMOL/L (ref 100–111)
CO2 SERPL-SCNC: 29 MMOL/L (ref 21–32)
CREAT SERPL-MCNC: 1.01 MG/DL (ref 0.6–1.3)
DIFFERENTIAL METHOD BLD: ABNORMAL
EOSINOPHIL # BLD: 0.2 K/UL (ref 0–0.4)
EOSINOPHIL NFR BLD: 3 % (ref 0–5)
ERYTHROCYTE [DISTWIDTH] IN BLOOD BY AUTOMATED COUNT: 13.9 % (ref 11.6–14.5)
GLUCOSE SERPL-MCNC: 105 MG/DL (ref 74–99)
HCT VFR BLD AUTO: 43.6 % (ref 36–48)
HGB BLD-MCNC: 14.5 G/DL (ref 13–16)
INR BLD: 1.9
LYMPHOCYTES # BLD: 1.4 K/UL (ref 0.9–3.6)
LYMPHOCYTES NFR BLD: 23 % (ref 21–52)
MCH RBC QN AUTO: 31.9 PG (ref 24–34)
MCHC RBC AUTO-ENTMCNC: 33.3 G/DL (ref 31–37)
MCV RBC AUTO: 95.8 FL (ref 74–97)
MONOCYTES # BLD: 0.4 K/UL (ref 0.05–1.2)
MONOCYTES NFR BLD: 7 % (ref 3–10)
NEUTS SEG # BLD: 4.3 K/UL (ref 1.8–8)
NEUTS SEG NFR BLD: 67 % (ref 40–73)
PLATELET # BLD AUTO: 219 K/UL (ref 135–420)
PMV BLD AUTO: 10.2 FL (ref 9.2–11.8)
POTASSIUM SERPL-SCNC: 4.6 MMOL/L (ref 3.5–5.5)
PT POC: 23 SECONDS
RBC # BLD AUTO: 4.55 M/UL (ref 4.7–5.5)
SODIUM SERPL-SCNC: 142 MMOL/L (ref 136–145)
VALID INTERNAL CONTROL?: YES
WBC # BLD AUTO: 6.4 K/UL (ref 4.6–13.2)

## 2019-11-12 PROCEDURE — 83880 ASSAY OF NATRIURETIC PEPTIDE: CPT

## 2019-11-12 PROCEDURE — 36415 COLL VENOUS BLD VENIPUNCTURE: CPT

## 2019-11-12 PROCEDURE — 80048 BASIC METABOLIC PNL TOTAL CA: CPT

## 2019-11-12 PROCEDURE — 85025 COMPLETE CBC W/AUTO DIFF WBC: CPT

## 2019-11-12 RX ORDER — ASCORBIC ACID 500 MG
500 TABLET ORAL
COMMUNITY
Start: 2019-10-05

## 2019-11-12 RX ORDER — SPIRONOLACTONE 25 MG/1
25 TABLET ORAL
COMMUNITY
Start: 2019-10-11

## 2019-11-12 RX ORDER — OMEPRAZOLE 20 MG/1
20 CAPSULE, DELAYED RELEASE ORAL
COMMUNITY

## 2019-11-12 RX ORDER — SERTRALINE HYDROCHLORIDE 50 MG/1
50 TABLET, FILM COATED ORAL
COMMUNITY

## 2019-11-12 RX ORDER — ZINC GLUCONATE 10 MG
LOZENGE ORAL
COMMUNITY
Start: 2019-05-20

## 2019-11-12 RX ORDER — LISINOPRIL 5 MG/1
5 TABLET ORAL
COMMUNITY
Start: 2019-11-01

## 2019-11-12 RX ORDER — AMIODARONE HYDROCHLORIDE 200 MG/1
200 TABLET ORAL
COMMUNITY

## 2019-11-12 RX ORDER — WARFARIN 3 MG/1
3 TABLET ORAL
COMMUNITY
Start: 2019-10-10

## 2019-11-12 NOTE — PROGRESS NOTES
HPI:  I saw Bijan Albarran in my office today in cardiovascular evaluation regarding sick sinus syndrome and his aortic valvular disease now status post aortic valve replacement with a Sailaja and spirits aortic valve, coronary bypass grafting surgery x2 with left internal mammary artery sequential graft to the diagonal and LAD and left atrial appendage ligation completed by Dr Trudi Henry on September 26, 2019 at the [facility]... Mr. Kimberlee Mckeon is a pleasant 61 year old  male who has moderate aortic stenosis and also has sick sinus syndrome with significant pauses, for which he is status post a dual chamber Medtronic pacemaker implantation by my associate, Dr. Yee Arias, back on 1/20/11. Historically his problem has actually been vasovagal presyncope with a strong cardioinhibitory component with 4-5 second pauses, which actually prompted the permanent pacemaker. He has developed aortic stenosis over the years which has slowly worsened and he began to have exertional chest tightness and shortness of breath back a number of months ago which had worsened when I saw him in the office last on March 13, 2019 and we planned at that time to do a nuclear myocardial perfusion study and an echocardiogram with the patient never got these completed and then presented with unstable angina and was [facility] I saw him in consultation on September 22, 2019. He was placed on therapeutic heparinization, but due to lack of cardiovascular surgical coverage at [facility] at the time of his admission he was transferred to the [facility] on September 23, 2019. He subsequently had an echocardiogram on September 24, 2019 through the [facility] system where he was found to have an ejection fraction of 30% a mean gradient of 29 mmHg across the valve and a aortic valve area of 0.8 cm².   He subsequently a balloon angioplasty of the aortic valve and a cardiac catheterization which demonstrated:    1. Mild disease in the left main trunk. 2.  99% stenosis proximally at the bifurcation of the LAD and a proximal first diagonal with VARGHESE II flow distally. 3.  Mild diffuse left circumflex disease. 4.  Mild diffuse dominant right coronary artery disease. It was decided that the best option for this patient was to have aortic valve replacement as well as bypass grafting surgery which was completed by Dr. Paula Ruiz on September 26, 2019 which included 27 mm Resilia Inspiris bioprosthetic AVR and coronary artery bypass grafting surgery x2 with a sequential left internal mammary artery to the diagonal and mid LAD. The patient also had a left atrial appendage ligation. Since surgery he has had a transesophageal echocardiogram on October 7, 2019 showing an ejection fraction of 20% with a normally functioning bioprosthetic aortic valve. During the hospitalization there was development of some paroxysmal atrial fibrillation for which he underwent cardioversion and was placed on amiodarone and he also has been maintained on Coumadin with plans on keeping him on both of these until reevaluation by Dr. Paige Cooley in early January 2020 at which time consideration for replacement of his right atrial lead which apparently was damaged at the time of surgery and is not currently working or simply proceeding with a by V AICD placement if in fact his LV function remains severely depressed. He comes in today and relates that he feels fine. 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 He really denies any problems with chest pain shortness of breath or any other cardiovascular complaints except for little mild peripheral edema. Encounter Diagnoses   Name Primary?     Anterior wall myocardial infarction ([address]     Hx of CABG X2 on 9/26/2019     Aortic stenosis, moderately severe and resolved s/p AVR     S/P AVR (aortic valve replacement) on 9/26/2019 with # 32 Cesia Inspiris bioprosthetic valve     SSS (sick [facility] syndrome) [facility]     Pacemaker - dual chamber with some damage to atrial lead at the time of CABG and AVR pacing VVI     Hypertensive heart disease      Hyperlipidemia, unspecified hyperlipidemia type     Encounter for current long-term use of anticoagulants Yes       Discussion: This patient appears to be doing reasonably well now about a month after his aortic valve replacement and coronary bypass grafting surgery which unfortunately occurred following an anterior wall myocardial infarction which actually occurred prior to his admission to [facility] back in September 2019. He had significant left ventricular dysfunction prior to his current bypass grafting surgery and aortic valve replacement, but clinically does not appear to be in any overt heart failure currently although I suspect he does have some component of heart failure and I am going to get a BMP and a BNP on him and will make further recommendations with regard to possibly adding low-dose Lasix to his regimen pending review of those laboratory tests. He apparently was cardioverted from atrial fibrillation back into sinus rhythm and is again in sinus rhythm today with evidence of old anterior infarction which was evident on admission to [facility] as well. His atrial lead was apparently damaged at the time of his open heart surgery but this is not at this point been replaced since the plan is to reevaluate him in January 2019 at which time if his LV function is still significantly depressed as I suspect it will be he is going to get a biventricular ICD. He is on both Coumadin and Cordarone and will stay on Cordarone until he runs out of his current prescription in mid December and will continue on Coumadin until he sees Dr. Braydon Lorenzo in January 2019.   At which time decision will be made on discontinuing Coumadin if he remains in sinus rhythm either replacing his atrial lead if his ventricular function is significantly improved or placing a biventricular ICD which seems more likely. We did check the patient's INR today and it was 1.9 and since the patient lives in the [facility] [place] we have given a prescription to get blood work through [employer] system in the [facility] and we will manage his Coumadin until such time as he follows up with Dr. Valeria Oppenheim with plans on keeping his INR in the 2.0-3.0 range. Since the patient lives in the [facility] and all of his care has been rendered through the [facility] would appear to be optimal for the patient to follow-up with Dr. Deni Steel and his team long-term particularly in view of my impending skilled nursing within the next year. PCP:  Srikanth Waite MD        Past Medical History:   Diagnosis Date    Aortic stenosis, mild 5/21/2012    Cancer ([facility] [address] [address]     malignant melanoma removed    Diabetes ([facility]     pre-diabetic    Diverticular disease     GERD (gastroesophageal reflux disease)     History of echocardiogram 10/06/2014    EF 59%. No WMA. Gr 1 DDfx. Coronary cusps of AV appear fused. Reduced mobility vs bicuspid. Mild AS (mean grad 18 mmHg).  History of exercise stress test 12/24/2010    [facility]:  Normal stress test at [facility] workload. Ex time 9 min 39 sec.       Hyperlipidemia     Hypertension     Hypertensive heart disease  10/20/2011    Obstructive sleep apnea     probable    Pacemaker 1/21/11    SSS (sick sinus syndrome) (HCC)     with reportedly prolonged pauses of 4 to 5 seconds, status post implantation of dual-chamber Medtronic pacemaker 1/20/11    Vasodepressor syncope 5/21/2012         Past Surgical History:   Procedure Laterality Date    HX HERNIA REPAIR      bilateral    HX ORTHOPAEDIC      foot surgery    HX PACEMAKER  1/20/11    Dual-chamber Medtronic pacemaker     Current Outpatient Medications   Medication Sig    lisinopril (PRINIVIL, ZESTRIL) 5 mg tablet Take 5 mg by mouth.  warfarin (COUMADIN) 3 mg tablet Take 3 mg by mouth.  sertraline (ZOLOFT) 50 mg tablet Take 50 mg by mouth.  spironolactone (ALDACTONE) 25 mg tablet Take 25 mg by mouth.  amiodarone (CORDARONE) 200 mg tablet Take 200 mg by mouth.  Krill-OM3-DHA-EPA-OM6-Lip-Astx 1000-130(40-80) mg cap Take 1 Cap by mouth.  magnesium 250 mg tab Take  by mouth.  omeprazole (PRILOSEC) 20 mg capsule Take 20 mg by mouth.  ascorbic acid, vitamin C, (VITAMIN C) 500 mg tablet Take 500 mg by mouth.  clonazePAM (KLONOPIN) 1 mg tablet Take 0.5 mg by mouth daily as needed.  omega 3-dha-epa-fish oil (FISH OIL) 100-160-1,000 mg cap Take  by mouth.  cholecalciferol (VITAMIN D3) 1,000 unit cap Take  by mouth daily.  aspirin delayed-release 81 mg tablet Take 162 mg by mouth daily.  albuterol (PROVENTIL HFA, VENTOLIN HFA, PROAIR HFA) 90 mcg/actuation inhaler Take 2 Puffs by inhalation every six (6) hours as needed. No current facility-administered medications for this visit. Allergies   Allergen Reactions    Crestor [Rosuvastatin] Myalgia     And sob        Social History     Socioeconomic History    Marital status:      Spouse name: Not on file    Number of children: Not on file    Years of education: Not on file    Highest education level: Not on file   Tobacco Use    Smoking status: Never Smoker    Smokeless tobacco: Never Used   Substance and Sexual Activity    Alcohol use: Yes     Comment: moderately    Drug use: No       History reviewed. No pertinent family history. Review of Systems:   Constitutional: Positive for malaise/fatigue. Negative for chills, fever and weight loss. Respiratory: Negative. Cardiovascular: Negative. Gastrointestinal: Positive for constipation. Negative for abdominal pain, blood in stool, diarrhea, heartburn, melena, nausea and vomiting. Musculoskeletal: Negative.     Neurological: Negative for dizziness. Physical Exam:   The patient is an alert, oriented, well developed, well nourished 64 y.o.  male who was in no acute distress at the time of my examination. Visit Vitals  /88 (BP 1 Location: Right arm, BP Patient Position: Sitting)   Pulse (!) 52   Ht 6' 3\" (1.905 m)   Wt 102.1 kg (225 lb)   SpO2 97%   BMI 28.12 kg/m²        BP Readings from Last 3 Encounters:   11/12/19 122/88   09/23/19 124/65   09/20/19 106/72        Wt Readings from Last 3 Encounters:   11/12/19 102.1 kg (225 lb)   09/23/19 106.1 kg (234 lb)   09/20/19 106.1 kg (234 lb)       HEENT: Conjunctivae white, mucosa moist, no pallor or cyanosis. Neck: Supple without masses, tenderness or thyromegaly. No jugular venous distention. Carotid upstrokes are full bilaterally, without bruits. Cardiovascular: Chest is symmetrical with good excursion. [place] is not displaced. There is a well-healed mid sternotomy scar no lifts, heaves or thrills. Normal S1 and bioprosthetic S2 without appreciable murmurs, rubs, clicks or gallops. Lungs: Clear to auscultation in all fields. Abdomen: Soft. No masses, tenderness or organomegaly. Extremities: Trace to 1+ edema with full peripheral pulses. Review of Data: Please refer to past medical history for most recent cardiac testing. Results for orders placed or performed in visit on 03/13/19   AMB POC EKG ROUTINE W/ 12 LEADS, INTER & REP     Status: None    Narrative    Sinus bradycardia, rate 54. This EKG is otherwise within normal limits and similar to the EKG of November 27, 2017. Og Parker D.O., F.A.C.C. [facility]  [facility] [place]  [address] [place] [address]. [address]    PLEASE NOTE:  This document has been produced using voice recognition software. Unrecognized errors in transcription may be present.

## 2019-11-12 NOTE — LETTER
11/12/2019 10:33 AM 
 
Mr. Ana Baldwin [phone]  [facility] Dear Mr. Almaguer Ace: We missed you at your last scheduled appointment on 11/12/19. We are committed to providing you excellent care and encourage you to contact our office to reschedule your appointment. We appreciate the confidence you've shown by selecting us to provide your healthcare needs and look forward to hearing from you soon. Please call us at [phone] at earliest convenience.

## 2019-11-13 NOTE — PROGRESS NOTES
Per last note'  He had significant left ventricular dysfunction prior to his current bypass grafting surgery and aortic valve replacement, but clinically does not appear to be in any overt heart failure currently although I suspect he does have some component of heart failure and I am going to get a BMP and a BNP on him and will make further recommendations with regard to possibly adding low-dose Lasix to his regimen pending review of those laboratory tests

## 2019-11-14 ENCOUNTER — TELEPHONE (OUTPATIENT)
Dept: CARDIOLOGY CLINIC | Age: 59
End: 2019-11-14

## 2019-11-14 RX ORDER — FUROSEMIDE 20 MG/1
20 TABLET ORAL DAILY
Qty: 31 TAB | Refills: 3 | Status: SHIPPED | OUTPATIENT
Start: 2019-11-14

## 2019-11-14 NOTE — TELEPHONE ENCOUNTER
I called and talked to the patient about his laboratory work. His CBC and BMP essentially look normal but his BNP is slightly elevated. I would add Lasix 20 mg daily to his regimen. Please call in to his pharmacy and killed double heels Elkin Lasix 20 mg daily #31 daily with 3 refills for now.  ES

## 2019-11-14 NOTE — TELEPHONE ENCOUNTER
----- Message from Burton Sharpe LPN sent at 52/93/2314 11:36 AM EST -----  Per last note'  He had significant left ventricular dysfunction prior to his current bypass grafting surgery and aortic valve replacement, but clinically does not appear to be in any overt heart failure currently although I suspect he does have some component of heart failure and I am going to get a BMP and a BNP on him and will make further recommendations with regard to possibly adding low-dose Lasix to his regimen pending review of those laboratory tests

## 2019-11-14 NOTE — TELEPHONE ENCOUNTER
----- Message from Sissy Bhatti LPN sent at 16/15/5966 11:36 AM EST -----  Per last note'  He had significant left ventricular dysfunction prior to his current bypass grafting surgery and aortic valve replacement, but clinically does not appear to be in any overt heart failure currently although I suspect he does have some component of heart failure and I am going to get a BMP and a BNP on him and will make further recommendations with regard to possibly adding low-dose Lasix to his regimen pending review of those laboratory tests

## 2022-03-18 PROBLEM — J20.9 ACUTE BRONCHITIS: Status: ACTIVE | Noted: 2019-09-22

## 2022-03-19 PROBLEM — I21.4 NSTEMI (NON-ST ELEVATED MYOCARDIAL INFARCTION) (HCC): Status: ACTIVE | Noted: 2019-09-22

## 2022-03-19 PROBLEM — R09.02 HYPOXIA: Status: ACTIVE | Noted: 2019-09-22

## 2022-03-19 PROBLEM — J81.1 PULMONARY EDEMA: Status: ACTIVE | Noted: 2019-09-22

## 2025-03-05 ENCOUNTER — TRANSCRIBE ORDERS (OUTPATIENT)
Facility: HOSPITAL | Age: 65
End: 2025-03-05

## 2025-03-05 DIAGNOSIS — I25.5 ISCHEMIC CARDIOMYOPATHY: Primary | ICD-10-CM

## 2025-03-05 DIAGNOSIS — I42.9 CARDIOMYOPATHY, UNSPECIFIED TYPE (HCC): ICD-10-CM

## 2025-03-28 ENCOUNTER — HOSPITAL ENCOUNTER (OUTPATIENT)
Facility: HOSPITAL | Age: 65
Discharge: HOME | End: 2025-03-30
Payer: COMMERCIAL

## 2025-03-28 VITALS
HEIGHT: 75 IN | SYSTOLIC BLOOD PRESSURE: 130 MMHG | WEIGHT: 240 LBS | DIASTOLIC BLOOD PRESSURE: 80 MMHG | BODY MASS INDEX: 29.84 KG/M2

## 2025-03-28 DIAGNOSIS — I42.9 CARDIOMYOPATHY, UNSPECIFIED TYPE (HCC): ICD-10-CM

## 2025-03-28 PROCEDURE — 93306 TTE W/DOPPLER COMPLETE: CPT

## 2025-03-29 LAB
ECHO AO ASC DIAM: 3.9 CM
ECHO AO ASCENDING AORTA INDEX: 1.65 CM/M2
ECHO AO ROOT DIAM: 3.6 CM
ECHO AO ROOT INDEX: 1.52 CM/M2
ECHO AV AREA PEAK VELOCITY: 2.1 CM2
ECHO AV AREA VTI: 2.1 CM2
ECHO AV AREA/BSA PEAK VELOCITY: 0.9 CM2/M2
ECHO AV AREA/BSA VTI: 0.9 CM2/M2
ECHO AV MEAN GRADIENT: 13 MMHG
ECHO AV MEAN VELOCITY: 1.7 M/S
ECHO AV PEAK GRADIENT: 22 MMHG
ECHO AV PEAK VELOCITY: 2.4 M/S
ECHO AV VELOCITY RATIO: 0.42
ECHO AV VTI: 49.4 CM
ECHO BSA: 2.4 M2
ECHO EST RA PRESSURE: 3 MMHG
ECHO LA VOL A-L A2C: 108 ML (ref 18–58)
ECHO LA VOL A-L A4C: 58 ML (ref 18–58)
ECHO LA VOL BP: 78 ML (ref 18–58)
ECHO LA VOL MOD A2C: 101 ML (ref 18–58)
ECHO LA VOL MOD A4C: 56 ML (ref 18–58)
ECHO LA VOL/BSA BIPLANE: 33 ML/M2 (ref 16–34)
ECHO LA VOLUME AREA LENGTH: 83 ML
ECHO LA VOLUME INDEX A-L A2C: 46 ML/M2 (ref 16–34)
ECHO LA VOLUME INDEX A-L A4C: 24 ML/M2 (ref 16–34)
ECHO LA VOLUME INDEX AREA LENGTH: 35 ML/M2 (ref 16–34)
ECHO LA VOLUME INDEX MOD A2C: 43 ML/M2 (ref 16–34)
ECHO LA VOLUME INDEX MOD A4C: 24 ML/M2 (ref 16–34)
ECHO LV EDV A2C: 188 ML
ECHO LV EDV A4C: 146 ML
ECHO LV EDV BP: 169 ML (ref 67–155)
ECHO LV EDV INDEX A4C: 62 ML/M2
ECHO LV EDV INDEX BP: 71 ML/M2
ECHO LV EDV NDEX A2C: 79 ML/M2
ECHO LV EF PHYSICIAN: 53 %
ECHO LV EJECTION FRACTION A2C: 64 %
ECHO LV EJECTION FRACTION A4C: 50 %
ECHO LV EJECTION FRACTION BIPLANE: 57 % (ref 55–100)
ECHO LV ESV A2C: 68 ML
ECHO LV ESV A4C: 73 ML
ECHO LV ESV BP: 72 ML (ref 22–58)
ECHO LV ESV INDEX A2C: 29 ML/M2
ECHO LV ESV INDEX A4C: 31 ML/M2
ECHO LV ESV INDEX BP: 30 ML/M2
ECHO LV FRACTIONAL SHORTENING: 22 % (ref 28–44)
ECHO LV INTERNAL DIMENSION DIASTOLE INDEX: 2.11 CM/M2
ECHO LV INTERNAL DIMENSION DIASTOLIC: 5 CM (ref 4.2–5.9)
ECHO LV INTERNAL DIMENSION SYSTOLIC INDEX: 1.65 CM/M2
ECHO LV INTERNAL DIMENSION SYSTOLIC: 3.9 CM
ECHO LV IVSD: 1.2 CM (ref 0.6–1)
ECHO LV MASS 2D: 207.1 G (ref 88–224)
ECHO LV MASS INDEX 2D: 87.4 G/M2 (ref 49–115)
ECHO LV POSTERIOR WALL DIASTOLIC: 1 CM (ref 0.6–1)
ECHO LV RELATIVE WALL THICKNESS RATIO: 0.4
ECHO LVOT AREA: 4.9 CM2
ECHO LVOT AV VTI INDEX: 0.44
ECHO LVOT DIAM: 2.5 CM
ECHO LVOT MEAN GRADIENT: 2 MMHG
ECHO LVOT PEAK GRADIENT: 4 MMHG
ECHO LVOT PEAK VELOCITY: 1 M/S
ECHO LVOT STROKE VOLUME INDEX: 44.9 ML/M2
ECHO LVOT SV: 106.5 ML
ECHO LVOT VTI: 21.7 CM
ECHO MV A VELOCITY: 0.83 M/S
ECHO MV E DECELERATION TIME (DT): 272.9 MS
ECHO MV E VELOCITY: 0.63 M/S
ECHO MV E/A RATIO: 0.76
ECHO PV MAX VELOCITY: 1.2 M/S
ECHO PV PEAK GRADIENT: 5 MMHG
ECHO RA VOLUME: 42 ML
ECHO RA VOLUME: 45 ML
ECHO RIGHT VENTRICULAR SYSTOLIC PRESSURE (RVSP): 18 MMHG
ECHO RV BASAL DIMENSION: 4.2 CM
ECHO RV TAPSE: 2.5 CM (ref 1.7–?)
ECHO TV REGURGITANT MAX VELOCITY: 1.92 M/S
ECHO TV REGURGITANT PEAK GRADIENT: 15 MMHG

## 2025-03-29 PROCEDURE — 93306 TTE W/DOPPLER COMPLETE: CPT | Performed by: INTERNAL MEDICINE

## 2025-03-31 ENCOUNTER — OFFICE VISIT (OUTPATIENT)
Age: 65
End: 2025-03-31
Payer: COMMERCIAL

## 2025-03-31 ENCOUNTER — ANTI-COAG VISIT (OUTPATIENT)
Age: 65
End: 2025-03-31

## 2025-03-31 ENCOUNTER — CLINICAL SUPPORT (OUTPATIENT)
Age: 65
End: 2025-03-31
Payer: COMMERCIAL

## 2025-03-31 ENCOUNTER — RESULTS FOLLOW-UP (OUTPATIENT)
Age: 65
End: 2025-03-31

## 2025-03-31 VITALS
BODY MASS INDEX: 30.71 KG/M2 | HEART RATE: 62 BPM | HEIGHT: 75 IN | DIASTOLIC BLOOD PRESSURE: 122 MMHG | WEIGHT: 247 LBS | OXYGEN SATURATION: 97 % | SYSTOLIC BLOOD PRESSURE: 190 MMHG

## 2025-03-31 DIAGNOSIS — Z95.2 S/P AVR (AORTIC VALVE REPLACEMENT): ICD-10-CM

## 2025-03-31 DIAGNOSIS — I42.9 CARDIOMYOPATHY, UNSPECIFIED TYPE (HCC): Primary | ICD-10-CM

## 2025-03-31 DIAGNOSIS — Z95.2 HX OF PROSTHETIC HEART VALVE: ICD-10-CM

## 2025-03-31 DIAGNOSIS — E78.5 HYPERLIPIDEMIA, UNSPECIFIED HYPERLIPIDEMIA TYPE: ICD-10-CM

## 2025-03-31 DIAGNOSIS — Z79.01 LONG TERM (CURRENT) USE OF ANTICOAGULANTS: ICD-10-CM

## 2025-03-31 DIAGNOSIS — Z95.2 S/P AVR (AORTIC VALVE REPLACEMENT): Primary | ICD-10-CM

## 2025-03-31 DIAGNOSIS — I48.0 PAROXYSMAL ATRIAL FIBRILLATION (HCC): ICD-10-CM

## 2025-03-31 DIAGNOSIS — I10 PRIMARY HYPERTENSION: ICD-10-CM

## 2025-03-31 DIAGNOSIS — G47.33 OBSTRUCTIVE SLEEP APNEA: ICD-10-CM

## 2025-03-31 DIAGNOSIS — Z86.73 HISTORY OF TIA (TRANSIENT ISCHEMIC ATTACK): ICD-10-CM

## 2025-03-31 DIAGNOSIS — I49.5 SSS (SICK SINUS SYNDROME) (HCC): ICD-10-CM

## 2025-03-31 DIAGNOSIS — I35.0 NONRHEUMATIC AORTIC VALVE STENOSIS: ICD-10-CM

## 2025-03-31 DIAGNOSIS — Z95.0 PACEMAKER: ICD-10-CM

## 2025-03-31 DIAGNOSIS — Z95.0 PACEMAKER: Primary | ICD-10-CM

## 2025-03-31 LAB
POC INR: 2.6
PROTHROMBIN TIME, POC: 31

## 2025-03-31 PROCEDURE — 93000 ELECTROCARDIOGRAM COMPLETE: CPT | Performed by: INTERNAL MEDICINE

## 2025-03-31 PROCEDURE — 99204 OFFICE O/P NEW MOD 45 MIN: CPT | Performed by: INTERNAL MEDICINE

## 2025-03-31 PROCEDURE — 3080F DIAST BP >= 90 MM HG: CPT | Performed by: INTERNAL MEDICINE

## 2025-03-31 PROCEDURE — 3077F SYST BP >= 140 MM HG: CPT | Performed by: INTERNAL MEDICINE

## 2025-03-31 PROCEDURE — 93288 INTERROG EVL PM/LDLS PM IP: CPT | Performed by: INTERNAL MEDICINE

## 2025-03-31 RX ORDER — TRAZODONE HYDROCHLORIDE 50 MG/1
1 TABLET ORAL NIGHTLY PRN
COMMUNITY
Start: 2025-01-08

## 2025-03-31 RX ORDER — PRAVASTATIN SODIUM 20 MG
20 TABLET ORAL EVERY EVENING
COMMUNITY

## 2025-03-31 RX ORDER — AMPICILLIN TRIHYDRATE 500 MG
CAPSULE ORAL DAILY
COMMUNITY

## 2025-03-31 RX ORDER — LISINOPRIL 20 MG/1
20 TABLET ORAL DAILY
Qty: 30 TABLET | Refills: 5 | Status: SHIPPED | OUTPATIENT
Start: 2025-03-31

## 2025-03-31 RX ORDER — MECLIZINE HYDROCHLORIDE 25 MG/1
25 TABLET ORAL 3 TIMES DAILY PRN
COMMUNITY

## 2025-03-31 RX ORDER — FUROSEMIDE 40 MG/1
1 TABLET ORAL DAILY PRN
COMMUNITY
Start: 2024-08-13

## 2025-03-31 RX ORDER — ASPIRIN 81 MG/1
81 TABLET ORAL DAILY
COMMUNITY

## 2025-03-31 RX ORDER — CARVEDILOL 25 MG/1
25 TABLET ORAL 2 TIMES DAILY
COMMUNITY
Start: 2025-03-05

## 2025-03-31 RX ORDER — WARFARIN SODIUM 3 MG/1
1 TABLET ORAL DAILY
COMMUNITY

## 2025-03-31 RX ORDER — WARFARIN SODIUM 5 MG/1
5 TABLET ORAL DAILY
COMMUNITY

## 2025-03-31 RX ORDER — LORAZEPAM 1 MG/1
1 TABLET ORAL EVERY 6 HOURS PRN
COMMUNITY
Start: 2024-10-07

## 2025-03-31 RX ORDER — FEXOFENADINE HCL 180 MG/1
180 TABLET ORAL DAILY PRN
COMMUNITY

## 2025-03-31 RX ORDER — EVOLOCUMAB 140 MG/ML
INJECTION, SOLUTION SUBCUTANEOUS
COMMUNITY

## 2025-03-31 RX ORDER — MAGNESIUM OXIDE 400 MG/1
400 TABLET ORAL DAILY
COMMUNITY

## 2025-03-31 ASSESSMENT — PATIENT HEALTH QUESTIONNAIRE - PHQ9
SUM OF ALL RESPONSES TO PHQ QUESTIONS 1-9: 0
2. FEELING DOWN, DEPRESSED OR HOPELESS: NOT AT ALL
SUM OF ALL RESPONSES TO PHQ QUESTIONS 1-9: 0
1. LITTLE INTEREST OR PLEASURE IN DOING THINGS: NOT AT ALL

## 2025-03-31 ASSESSMENT — ENCOUNTER SYMPTOMS
BACK PAIN: 0
WHEEZING: 0
TROUBLE SWALLOWING: 0
SHORTNESS OF BREATH: 0
VOICE CHANGE: 0
DIARRHEA: 0
BLOOD IN STOOL: 0
ABDOMINAL PAIN: 0
VOMITING: 0
CONSTIPATION: 0
COUGH: 0
NAUSEA: 0

## 2025-03-31 NOTE — PROGRESS NOTES
Donavan Yuon presents today for   Chief Complaint   Patient presents with    New Patient       Donavan Hidalgo preferred language for health care discussion is english/other.    Is someone accompanying this pt? no    Is the patient using any DME equipment during OV? no    Depression Screening:  Depression: Not at risk (3/31/2025)    PHQ-2     PHQ-2 Score: 0        Learning Assessment:  Who is the primary learner? Patient    What is the preferred language for health care of the primary learner? ENGLISH    How does the primary learner prefer to learn new concepts? DEMONSTRATION    Answered By patient    Relationship to Learner SELF           Pt currently taking Anticoagulant therapy? no    Pt currently taking Antiplatelet therapy ?aspirin  warfarin      Coordination of Care:  1. Have you been to the ER, urgent care clinic since your last visit? Hospitalized since your last visit? no    2. Have you seen or consulted any other health care providers outside of the [facility] since your last visit? Include any pap smears or colon screening. no  3

## 2025-03-31 NOTE — PROGRESS NOTES
Verbal order and read back per BILL Bustillo - NP     The INR is stable and therapeutic.  Continue Coumadin 5 mg daily except 3 mg on Tues-Thurs-Sun. Recheck in 4 weeks.    This has been fully explained to the patient, who indicates understanding.    - faxing enrollment form to [facility] for home monitoring.

## 2025-03-31 NOTE — PATIENT INSTRUCTIONS
Increase coreg to 25 mg. Take 1 tablet twice daily.   Start lisinopril 20 mg. Take 1 tablet once daily.     Lasix Daily AS NEEDED - if weight increases by 3 lbs, take 1 tablet. If weight doesn't decrease after 4 consecutive days of taking lasix, call and notify Dr. Gardner

## 2025-03-31 NOTE — PROGRESS NOTES
04/01/25     Chief Complaint   Patient presents with    New Patient       HPI:   Donavan Hidalgo is a 65 y.o. White (non-) male  regarding sick sinus syndrome, hypertension, and his aortic valvular disease now status post aortic valve replacement with a 27 mm Resilia Inspiris bioprosthetic AVR, coronary bypass grafting surgery x2 with left internal mammary artery sequential graft to the diagonal and LAD and left atrial appendage ligation completed by Dr Veras on September 26, 2019 at the [facility]...He has history of moderate aortic stenosis and also has sick sinus syndrome with significant pauses, for which he is status post a dual chamber Medtronic pacemaker implantation by my associate, Dr. Enrique Umana, back on 1/20/11. Historically, his problem has actually been vasovagal presyncope with a strong cardioinhibitory component with 4-5 second pauses, which actually prompted the permanent pacemaker.     He has developed aortic stenosis over the years which has slowly worsened and he began to have exertional chest tightness and shortness of breath back in early 2019 which had worsened when I saw him in the office last on March 13, 2019 and we planned at that time to do a nuclear myocardial perfusion study and an echocardiogram, but before the patient could get these completed he presented with unstable angina and was admitted to [facility] and I saw him in consultation on September 22, 2019.  He was placed on therapeutic heparinization, but due to lack of cardiovascular surgical coverage at [facility] at the time of his admission he was transferred to the [facility] on September 23, 2019.  He subsequently had an echocardiogram on September 24, 2019 through the [facility] system where he was found to have an ejection fraction of 30% a mean gradient of 29 mmHg across the valve and a aortic valve area of 0.8 cm².  He subsequently a balloon angioplasty of the aortic

## 2025-04-01 PROBLEM — I10 PRIMARY HYPERTENSION: Status: ACTIVE | Noted: 2025-04-01

## 2025-04-02 ENCOUNTER — TELEPHONE (OUTPATIENT)
Age: 65
End: 2025-04-02

## 2025-04-02 NOTE — TELEPHONE ENCOUNTER
----- Message from Diego PANCHAL sent at 4/1/2025  9:18 AM EDT -----  I got him scheduled  ----- Message -----  From: Renetta Vega RN  Sent: 4/1/2025   6:04 AM EDT  To: Diego Hand; Abbie Marmolejo RN; #    I put in for him to be transferred into our carelink clinic. He will need carelinks scheduled and 1 year office device check  ----- Message -----  From: Abbie Marmolejo, CRISTHIAN  Sent: 3/31/2025   2:42 PM EDT  To: Renetta Vega RN    Does this patient do carelinks? His device checks were not scheduled.     Abbie Marmolejo, CRISTHIAN  Clinical Coordinator   [facility] at [facility]

## 2025-04-07 ENCOUNTER — TELEPHONE (OUTPATIENT)
Age: 65
End: 2025-04-07

## 2025-04-07 NOTE — TELEPHONE ENCOUNTER
The patient called me about some swelling in his feet and I checked to see if his weight was increased and it was not and he said that there was no real rash although he had some blood blisters on the lower part of his foot.  For now we are going to give him Aldactone 25 mg daily in addition to his Lasix and if this problem persists particularly with some bleeding under the skin we will get a CBC and probably a BMP as well.  His blood pressures have been running in the 140s to 160s over 83-90 so we do have to work on his blood pressure some more and I may add a drug such as amlodipine in the future.    West Gardner JR, DO

## 2025-05-05 ENCOUNTER — OFFICE VISIT (OUTPATIENT)
Age: 65
End: 2025-05-05
Payer: COMMERCIAL

## 2025-05-05 VITALS
HEART RATE: 66 BPM | SYSTOLIC BLOOD PRESSURE: 138 MMHG | BODY MASS INDEX: 30.34 KG/M2 | DIASTOLIC BLOOD PRESSURE: 80 MMHG | WEIGHT: 244 LBS | HEIGHT: 75 IN | OXYGEN SATURATION: 96 %

## 2025-05-05 DIAGNOSIS — I49.5 SSS (SICK SINUS SYNDROME) (HCC): ICD-10-CM

## 2025-05-05 DIAGNOSIS — E78.00 HYPERCHOLESTEROLEMIA: ICD-10-CM

## 2025-05-05 DIAGNOSIS — I10 PRIMARY HYPERTENSION: ICD-10-CM

## 2025-05-05 DIAGNOSIS — I48.0 PAROXYSMAL ATRIAL FIBRILLATION (HCC): ICD-10-CM

## 2025-05-05 DIAGNOSIS — R29.898 WEAKNESS OF LOWER EXTREMITY, UNSPECIFIED LATERALITY: Primary | ICD-10-CM

## 2025-05-05 DIAGNOSIS — I42.9 CARDIOMYOPATHY, UNSPECIFIED TYPE (HCC): ICD-10-CM

## 2025-05-05 DIAGNOSIS — Z95.2 S/P AVR (AORTIC VALVE REPLACEMENT): ICD-10-CM

## 2025-05-05 DIAGNOSIS — R53.83 FATIGUE, UNSPECIFIED TYPE: ICD-10-CM

## 2025-05-05 DIAGNOSIS — Z79.01 LONG TERM (CURRENT) USE OF ANTICOAGULANTS: ICD-10-CM

## 2025-05-05 DIAGNOSIS — R06.02 SHORTNESS OF BREATH: ICD-10-CM

## 2025-05-05 DIAGNOSIS — Z95.0 PACEMAKER: ICD-10-CM

## 2025-05-05 DIAGNOSIS — Z86.73 HISTORY OF TIA (TRANSIENT ISCHEMIC ATTACK): ICD-10-CM

## 2025-05-05 PROCEDURE — 3079F DIAST BP 80-89 MM HG: CPT | Performed by: INTERNAL MEDICINE

## 2025-05-05 PROCEDURE — 93000 ELECTROCARDIOGRAM COMPLETE: CPT | Performed by: INTERNAL MEDICINE

## 2025-05-05 PROCEDURE — 3075F SYST BP GE 130 - 139MM HG: CPT | Performed by: INTERNAL MEDICINE

## 2025-05-05 PROCEDURE — 99214 OFFICE O/P EST MOD 30 MIN: CPT | Performed by: INTERNAL MEDICINE

## 2025-05-05 RX ORDER — METOPROLOL SUCCINATE 25 MG/1
25 TABLET, EXTENDED RELEASE ORAL DAILY
COMMUNITY

## 2025-05-05 RX ORDER — TRIAMCINOLONE ACETONIDE 55 UG/1
2 SPRAY, METERED NASAL DAILY
COMMUNITY
Start: 2025-04-21 | End: 2026-04-21

## 2025-05-05 RX ORDER — CARVEDILOL 25 MG/1
25 TABLET ORAL 2 TIMES DAILY
Qty: 180 TABLET | Refills: 3 | Status: SHIPPED | OUTPATIENT
Start: 2025-05-05

## 2025-05-05 RX ORDER — LISINOPRIL 20 MG/1
20 TABLET ORAL DAILY
Qty: 90 TABLET | Refills: 3 | Status: SHIPPED | OUTPATIENT
Start: 2025-05-05

## 2025-05-05 NOTE — PROGRESS NOTES
Donavan Hidalgo presents today for   Chief Complaint   Patient presents with    Follow-up     1 to 2 month follow up       Donavanmajor Yuon preferred language for health care discussion is english/other.    Is someone accompanying this pt? no    Is the patient using any DME equipment during OV? no    Depression Screening:  Depression: Not at risk (3/31/2025)    PHQ-2     PHQ-2 Score: 0        Learning Assessment:  No question data found.       Pt currently taking Anticoagulant therapy? Warfarin  3mg every sun,tue,thur. 5mg all other days    Pt currently taking Antiplatelet therapy ? Aspirin 81 mg daily      Coordination of Care:  1. Have you been to the ER, urgent care clinic since your last visit? Hospitalized since your last visit? no    2. Have you seen or consulted any other health care providers outside of the [facility] since your last visit? Include any pap smears or colon screening. no    
statin drugs, but stopping statins did not seem to help and for some time he was on Repatha with the same symptoms.  The weakness in his legs and discomfort in his legs has made it so he can even take a walk with his wife and he was so fed up with all of his medications at about 4 days ago he stopped them all and his except for Coumadin and Coreg which he actually doubled.    When I saw him back on March 31, 2025 his blood pressure was significantly elevated at 190/122 and he had come off of all of his medications including Entresto except for Coreg and Coumadin which he did because of leg pain and that seems to be better currently.  I decided to put him back on lisinopril 20 mg daily because he tolerated lisinopril long-term and he comes in today with controlled blood pressure and relating that the pain in his legs is better but he still has weakness in his legs which is worse on the left but he also has some foot drop on the left foot.  He has had an MRI of his lumbar spine which demonstrated some multilevel lumbar spondylosis with varying degrees of foraminal stenosis but these all appear to be mild to moderate at worst and there did not appear to be any obvious abnormality which would be causing leg weakness.  He also relates that he is fatigued and has little shortness of breath with exertion as well which has been slowly worsening without any anginal type symptoms or any other cardiovascular complaints except for a little lower extremity edema.  However, his edema comes on primarily throughout the day and largely goes away overnight to suggest this from venous insufficiency.    Past Medical History:   Diagnosis Date    Aortic stenosis, mild 5/21/2012    Benign hypertensive heart disease without heart failure 10/20/2011    Cancer (HCC)     malignant melanoma removed    Diabetes (HCC)     pre-diabetic    Diverticular disease     GERD (gastroesophageal reflux disease)     History of echocardiogram 10/06/2014

## 2025-06-09 ENCOUNTER — TELEPHONE (OUTPATIENT)
Age: 65
End: 2025-06-09

## 2025-06-09 NOTE — TELEPHONE ENCOUNTER
Received a non formulary exception form via fax from [facility]. The form was completed and signed by provider.    The form was faxed to [phone].

## 2025-06-10 NOTE — TELEPHONE ENCOUNTER
Received a fax from [facility] stating that the request for prior authorization for lisinopril has been cancelled, because this medication does not require prior authorization for review.

## 2025-06-18 ENCOUNTER — RESULTS FOLLOW-UP (OUTPATIENT)
Age: 65
End: 2025-06-18

## 2025-06-25 ENCOUNTER — TELEPHONE (OUTPATIENT)
Age: 65
End: 2025-06-25

## 2025-06-26 ENCOUNTER — RESULTS FOLLOW-UP (OUTPATIENT)
Age: 65
End: 2025-06-26

## 2025-06-27 ENCOUNTER — HOSPITAL ENCOUNTER (INPATIENT)
Facility: HOSPITAL | Age: 65
LOS: 3 days | Discharge: HOME | DRG: 291 | End: 2025-06-30
Attending: FAMILY MEDICINE | Admitting: INTERNAL MEDICINE
Payer: COMMERCIAL

## 2025-06-27 ENCOUNTER — APPOINTMENT (OUTPATIENT)
Facility: HOSPITAL | Age: 65
DRG: 291 | End: 2025-06-27
Attending: FAMILY MEDICINE
Payer: COMMERCIAL

## 2025-06-27 DIAGNOSIS — I42.9 CARDIOMYOPATHY, UNSPECIFIED TYPE (HCC): Primary | ICD-10-CM

## 2025-06-27 DIAGNOSIS — R94.39 POSITIVE CARDIAC STRESS TEST: ICD-10-CM

## 2025-06-27 DIAGNOSIS — R93.1 DECREASED CARDIAC EJECTION FRACTION: ICD-10-CM

## 2025-06-27 DIAGNOSIS — I50.9 HEART FAILURE, UNSPECIFIED HF CHRONICITY, UNSPECIFIED HEART FAILURE TYPE (HCC): ICD-10-CM

## 2025-06-27 PROBLEM — I50.23 ACUTE ON CHRONIC SYSTOLIC CHF (CONGESTIVE HEART FAILURE) (HCC): Status: ACTIVE | Noted: 2025-06-27

## 2025-06-27 LAB
ALBUMIN SERPL-MCNC: 3.9 G/DL (ref 3.4–5)
ALBUMIN/GLOB SERPL: 1.3 (ref 0.8–1.7)
ALP SERPL-CCNC: 75 U/L (ref 45–117)
ALT SERPL-CCNC: 16 U/L (ref 10–50)
ANION GAP SERPL CALC-SCNC: 14 MMOL/L (ref 3–18)
AST SERPL-CCNC: 25 U/L (ref 10–38)
BASOPHILS # BLD: 0.05 K/UL (ref 0–0.1)
BASOPHILS NFR BLD: 0.6 % (ref 0–2)
BILIRUB SERPL-MCNC: 0.8 MG/DL (ref 0.2–1)
BUN SERPL-MCNC: 26 MG/DL (ref 6–23)
BUN/CREAT SERPL: 21 (ref 12–20)
CALCIUM SERPL-MCNC: 9.4 MG/DL (ref 8.5–10.1)
CHLORIDE SERPL-SCNC: 101 MMOL/L (ref 98–107)
CO2 SERPL-SCNC: 22 MMOL/L (ref 21–32)
CREAT SERPL-MCNC: 1.23 MG/DL (ref 0.6–1.3)
DIFFERENTIAL METHOD BLD: ABNORMAL
EOSINOPHIL # BLD: 0.15 K/UL (ref 0–0.4)
EOSINOPHIL NFR BLD: 1.9 % (ref 0–5)
ERYTHROCYTE [DISTWIDTH] IN BLOOD BY AUTOMATED COUNT: 13 % (ref 11.6–14.5)
GLOBULIN SER CALC-MCNC: 3 G/DL (ref 2–4)
GLUCOSE SERPL-MCNC: 100 MG/DL (ref 74–108)
HCT VFR BLD AUTO: 48.1 % (ref 36–48)
HGB BLD-MCNC: 16.4 G/DL (ref 13–16)
IMM GRANULOCYTES # BLD AUTO: 0.02 K/UL (ref 0–0.04)
IMM GRANULOCYTES NFR BLD AUTO: 0.3 % (ref 0–0.5)
INR PPP: 2 (ref 0.9–1.1)
LYMPHOCYTES # BLD: 2.12 K/UL (ref 0.9–3.6)
LYMPHOCYTES NFR BLD: 26.5 % (ref 21–52)
MCH RBC QN AUTO: 32.6 PG (ref 24–34)
MCHC RBC AUTO-ENTMCNC: 34.1 G/DL (ref 31–37)
MCV RBC AUTO: 95.6 FL (ref 78–100)
MONOCYTES # BLD: 0.74 K/UL (ref 0.05–1.2)
MONOCYTES NFR BLD: 9.3 % (ref 3–10)
NEUTS SEG # BLD: 4.91 K/UL (ref 1.8–8)
NEUTS SEG NFR BLD: 61.4 % (ref 40–73)
NRBC # BLD: 0 K/UL (ref 0–0.01)
NRBC BLD-RTO: 0 PER 100 WBC
NT PRO BNP: 573 PG/ML (ref 36–900)
PLATELET # BLD AUTO: 241 K/UL (ref 135–420)
PMV BLD AUTO: 9.6 FL (ref 9.2–11.8)
POTASSIUM SERPL-SCNC: 3.9 MMOL/L (ref 3.5–5.5)
PROT SERPL-MCNC: 6.9 G/DL (ref 6.4–8.2)
PROTHROMBIN TIME: 23.1 SEC (ref 11.9–14.9)
RBC # BLD AUTO: 5.03 M/UL (ref 4.35–5.65)
SODIUM SERPL-SCNC: 137 MMOL/L (ref 136–145)
TROPONIN T SERPL HS-MCNC: 27.2 NG/L (ref 0–22)
WBC # BLD AUTO: 8 K/UL (ref 4.6–13.2)

## 2025-06-27 PROCEDURE — 83880 ASSAY OF NATRIURETIC PEPTIDE: CPT

## 2025-06-27 PROCEDURE — 85610 PROTHROMBIN TIME: CPT

## 2025-06-27 PROCEDURE — 2500000003 HC RX 250 WO HCPCS: Performed by: INTERNAL MEDICINE

## 2025-06-27 PROCEDURE — 85025 COMPLETE CBC W/AUTO DIFF WBC: CPT

## 2025-06-27 PROCEDURE — 1100000003 HC PRIVATE W/ TELEMETRY

## 2025-06-27 PROCEDURE — 6360000002 HC RX W HCPCS: Performed by: INTERNAL MEDICINE

## 2025-06-27 PROCEDURE — 71045 X-RAY EXAM CHEST 1 VIEW: CPT

## 2025-06-27 PROCEDURE — 80053 COMPREHEN METABOLIC PANEL: CPT

## 2025-06-27 PROCEDURE — 6370000000 HC RX 637 (ALT 250 FOR IP): Performed by: INTERNAL MEDICINE

## 2025-06-27 PROCEDURE — 84484 ASSAY OF TROPONIN QUANT: CPT

## 2025-06-27 PROCEDURE — 36415 COLL VENOUS BLD VENIPUNCTURE: CPT

## 2025-06-27 RX ORDER — FUROSEMIDE 10 MG/ML
40 INJECTION INTRAMUSCULAR; INTRAVENOUS 2 TIMES DAILY
Status: DISCONTINUED | OUTPATIENT
Start: 2025-06-27 | End: 2025-06-28

## 2025-06-27 RX ORDER — POTASSIUM CHLORIDE 1500 MG/1
40 TABLET, EXTENDED RELEASE ORAL PRN
Status: DISCONTINUED | OUTPATIENT
Start: 2025-06-27 | End: 2025-06-30 | Stop reason: HOSPADM

## 2025-06-27 RX ORDER — SODIUM CHLORIDE 0.9 % (FLUSH) 0.9 %
5-40 SYRINGE (ML) INJECTION PRN
Status: DISCONTINUED | OUTPATIENT
Start: 2025-06-27 | End: 2025-06-30 | Stop reason: HOSPADM

## 2025-06-27 RX ORDER — CARVEDILOL 25 MG/1
25 TABLET ORAL 2 TIMES DAILY
Status: DISCONTINUED | OUTPATIENT
Start: 2025-06-27 | End: 2025-06-30 | Stop reason: HOSPADM

## 2025-06-27 RX ORDER — TRAZODONE HYDROCHLORIDE 50 MG/1
50 TABLET ORAL NIGHTLY PRN
Status: DISCONTINUED | OUTPATIENT
Start: 2025-06-27 | End: 2025-06-30 | Stop reason: HOSPADM

## 2025-06-27 RX ORDER — MECOBALAMIN 5000 MCG
5 TABLET,DISINTEGRATING ORAL NIGHTLY PRN
Status: DISCONTINUED | OUTPATIENT
Start: 2025-06-27 | End: 2025-06-30 | Stop reason: HOSPADM

## 2025-06-27 RX ORDER — MAGNESIUM SULFATE IN WATER 40 MG/ML
2000 INJECTION, SOLUTION INTRAVENOUS PRN
Status: DISCONTINUED | OUTPATIENT
Start: 2025-06-27 | End: 2025-06-30 | Stop reason: HOSPADM

## 2025-06-27 RX ORDER — ACETAMINOPHEN 325 MG/1
650 TABLET ORAL EVERY 6 HOURS PRN
Status: DISCONTINUED | OUTPATIENT
Start: 2025-06-27 | End: 2025-06-30 | Stop reason: HOSPADM

## 2025-06-27 RX ORDER — SODIUM CHLORIDE 9 MG/ML
INJECTION, SOLUTION INTRAVENOUS PRN
Status: DISCONTINUED | OUTPATIENT
Start: 2025-06-27 | End: 2025-06-30 | Stop reason: HOSPADM

## 2025-06-27 RX ORDER — PANTOPRAZOLE SODIUM 40 MG/1
40 TABLET, DELAYED RELEASE ORAL
Status: DISCONTINUED | OUTPATIENT
Start: 2025-06-28 | End: 2025-06-30 | Stop reason: HOSPADM

## 2025-06-27 RX ORDER — ONDANSETRON 2 MG/ML
4 INJECTION INTRAMUSCULAR; INTRAVENOUS EVERY 6 HOURS PRN
Status: DISCONTINUED | OUTPATIENT
Start: 2025-06-27 | End: 2025-06-30 | Stop reason: HOSPADM

## 2025-06-27 RX ORDER — VITAMIN B COMPLEX
1000 TABLET ORAL DAILY
Status: DISCONTINUED | OUTPATIENT
Start: 2025-06-28 | End: 2025-06-30 | Stop reason: HOSPADM

## 2025-06-27 RX ORDER — SPIRONOLACTONE 25 MG/1
25 TABLET ORAL DAILY
COMMUNITY

## 2025-06-27 RX ORDER — POTASSIUM CHLORIDE 7.45 MG/ML
10 INJECTION INTRAVENOUS PRN
Status: DISCONTINUED | OUTPATIENT
Start: 2025-06-27 | End: 2025-06-30 | Stop reason: HOSPADM

## 2025-06-27 RX ORDER — LANOLIN ALCOHOL/MO/W.PET/CERES
400 CREAM (GRAM) TOPICAL DAILY
Status: DISCONTINUED | OUTPATIENT
Start: 2025-06-28 | End: 2025-06-30 | Stop reason: HOSPADM

## 2025-06-27 RX ORDER — POLYETHYLENE GLYCOL 3350 17 G/17G
17 POWDER, FOR SOLUTION ORAL DAILY PRN
Status: DISCONTINUED | OUTPATIENT
Start: 2025-06-27 | End: 2025-06-30 | Stop reason: HOSPADM

## 2025-06-27 RX ORDER — ASPIRIN 81 MG/1
81 TABLET ORAL DAILY
Status: DISCONTINUED | OUTPATIENT
Start: 2025-06-28 | End: 2025-06-30 | Stop reason: HOSPADM

## 2025-06-27 RX ORDER — SODIUM CHLORIDE 0.9 % (FLUSH) 0.9 %
5-40 SYRINGE (ML) INJECTION EVERY 12 HOURS SCHEDULED
Status: DISCONTINUED | OUTPATIENT
Start: 2025-06-27 | End: 2025-06-30 | Stop reason: HOSPADM

## 2025-06-27 RX ORDER — IPRATROPIUM BROMIDE AND ALBUTEROL SULFATE 2.5; .5 MG/3ML; MG/3ML
1 SOLUTION RESPIRATORY (INHALATION) EVERY 6 HOURS PRN
Status: DISCONTINUED | OUTPATIENT
Start: 2025-06-27 | End: 2025-06-30 | Stop reason: HOSPADM

## 2025-06-27 RX ORDER — MECLIZINE HCL 12.5 MG 12.5 MG/1
25 TABLET ORAL 3 TIMES DAILY PRN
Status: DISCONTINUED | OUTPATIENT
Start: 2025-06-27 | End: 2025-06-30 | Stop reason: HOSPADM

## 2025-06-27 RX ORDER — ACETAMINOPHEN 650 MG/1
650 SUPPOSITORY RECTAL EVERY 6 HOURS PRN
Status: DISCONTINUED | OUTPATIENT
Start: 2025-06-27 | End: 2025-06-30 | Stop reason: HOSPADM

## 2025-06-27 RX ORDER — WARFARIN SODIUM 5 MG/1
5 TABLET ORAL
Status: COMPLETED | OUTPATIENT
Start: 2025-06-27 | End: 2025-06-27

## 2025-06-27 RX ORDER — LABETALOL HYDROCHLORIDE 5 MG/ML
5 INJECTION, SOLUTION INTRAVENOUS
Status: DISCONTINUED | OUTPATIENT
Start: 2025-06-27 | End: 2025-06-29

## 2025-06-27 RX ORDER — FLUTICASONE PROPIONATE 50 MCG
2 SPRAY, SUSPENSION (ML) NASAL DAILY PRN
Status: DISCONTINUED | OUTPATIENT
Start: 2025-06-27 | End: 2025-06-30 | Stop reason: HOSPADM

## 2025-06-27 RX ORDER — ONDANSETRON 4 MG/1
4 TABLET, ORALLY DISINTEGRATING ORAL EVERY 8 HOURS PRN
Status: DISCONTINUED | OUTPATIENT
Start: 2025-06-27 | End: 2025-06-30 | Stop reason: HOSPADM

## 2025-06-27 RX ORDER — SPIRONOLACTONE 25 MG/1
25 TABLET ORAL DAILY
Status: DISCONTINUED | OUTPATIENT
Start: 2025-06-28 | End: 2025-06-30 | Stop reason: HOSPADM

## 2025-06-27 RX ORDER — LISINOPRIL 20 MG/1
20 TABLET ORAL DAILY
Status: DISCONTINUED | OUTPATIENT
Start: 2025-06-28 | End: 2025-06-30 | Stop reason: HOSPADM

## 2025-06-27 RX ORDER — LORAZEPAM 1 MG/1
1 TABLET ORAL EVERY 6 HOURS PRN
Status: DISCONTINUED | OUTPATIENT
Start: 2025-06-27 | End: 2025-06-30 | Stop reason: HOSPADM

## 2025-06-27 RX ORDER — HYDRALAZINE HYDROCHLORIDE 20 MG/ML
5 INJECTION INTRAMUSCULAR; INTRAVENOUS EVERY 6 HOURS PRN
Status: DISCONTINUED | OUTPATIENT
Start: 2025-06-27 | End: 2025-06-30 | Stop reason: HOSPADM

## 2025-06-27 RX ADMIN — CARVEDILOL 25 MG: 25 TABLET, FILM COATED ORAL at 23:19

## 2025-06-27 RX ADMIN — WARFARIN SODIUM 5 MG: 5 TABLET ORAL at 23:13

## 2025-06-27 RX ADMIN — SODIUM CHLORIDE, PRESERVATIVE FREE 10 ML: 5 INJECTION INTRAVENOUS at 23:23

## 2025-06-27 RX ADMIN — FUROSEMIDE 40 MG: 10 INJECTION, SOLUTION INTRAMUSCULAR; INTRAVENOUS at 18:42

## 2025-06-27 RX ADMIN — Medication 600 MG: at 23:13

## 2025-06-27 ASSESSMENT — PAIN SCALES - GENERAL
PAINLEVEL_OUTOF10: 0
PAINLEVEL_OUTOF10: 0

## 2025-06-27 NOTE — PROGRESS NOTES
Consult for Warfarin Dosing by Pharmacy by Dr. Li  Consult provided for this 65 y.o.  male , for indication of Heart Valve Replacement    Dose to achieve an INR goal of 2.5 to 3.5  Home dose: 5 mg daily on Mondays, Weds, Friday and Saturdays and followed with 3 mg on Tuesday, Thursday, and Sundays    Last INR = 2.3 on 6/26/25 at [facility] ED visit    [facility] Dosing Services      Warfarin  5 (mg) to be given today at 18:00.     DATE INR DOSE   6/26 2.3 3mg presumed   6/27 pending 5 mg       Significant drug interactions: None  PT/INR Lab Results   Component Value Date/Time    INR 2.6 03/31/2025 01:43 PM    INR 1.1 09/22/2019 10:00 AM       Platelets Lab Results   Component Value Date/Time     06/27/2025 06:14 PM      H/H Lab Results   Component Value Date/Time    HGB 16.4 06/27/2025 06:14 PM        Pharmacy to follow daily and will provide subsequent Warfarin dosing based on clinical status.     Signed DALE العلي RPH

## 2025-06-27 NOTE — H&P
History and Physical    Patient: Donavan Hidalgo MRN: [id number]  SSN: [id number]    YOB: 1960  Age: 65 y.o.  Sex: male      Subjective:      Donavan Hidalgo is a 65 y.o. male who presents as a Direct Admission to [facility] ([facility]) Telemetry Unit for management of Acute on Chronic Systolic Congestive Heart Failure (a.k.a. CHF) and later Left Heart Catheterization.  Chart Review shows that Nuclear Stress Test performed on 6/26/2025 resulted in High Risk with LVEF 23% and there was a perfusion defect noted.  Patient endorses the information above and reports that his previous Left Ventricular Ejection Fraction (a.k.a. LVEF) was >50% in March 2025.  Patient reports that he has been having shortness of breath more so than dyspnea on exertion and that this typically occurs at rest.  Patient endorses orthopnea, weight gain, and bilateral lower extremity edema over approximately the last week.  Patient also reports that he is having intermittent chest pain that is sternal in its location, brief in its duration, feels \"like gas,\" and is non-exertional.  Patient reports that he has been having some nausea (non-exertional); mild, non-productive cough without pain with inspiration; and diarrhea for a few days last week.  Patient verifies his Home Medication List and reports that he had not been taking his Home Spironolactone and stated that he was taking his PO Furosemide \"as needed\" when he felt he had some edema.  Patient denies fevers, chills, vomiting, and dysuria.    In [facility] ER, Patient is noted to have Temperature 98.1°F, Heart Rate 62 bpm, Respiration Rate 18 bpm, Blood Pressure 175/110 mm Hg (Only a single Blood Pressure was measured/recorded/validated at this time]), and SpO2 97% on Room Air.  Lab Studies are PENDING. CXR has been read by Radiologist to show \"No edema. No acute disease.  Small density on the right, possible pulmonary nodule right upper lobe.  Alternatively

## 2025-06-28 ENCOUNTER — APPOINTMENT (OUTPATIENT)
Facility: HOSPITAL | Age: 65
DRG: 291 | End: 2025-06-28
Attending: FAMILY MEDICINE
Payer: COMMERCIAL

## 2025-06-28 PROBLEM — I42.9 CARDIOMYOPATHY (HCC): Status: ACTIVE | Noted: 2025-06-28

## 2025-06-28 LAB
ALBUMIN SERPL-MCNC: 3.8 G/DL (ref 3.4–5)
ALBUMIN/GLOB SERPL: 1.3 (ref 0.8–1.7)
ALP SERPL-CCNC: 71 U/L (ref 45–117)
ALT SERPL-CCNC: 17 U/L (ref 10–50)
ANION GAP SERPL CALC-SCNC: 11 MMOL/L (ref 3–18)
AST SERPL-CCNC: 23 U/L (ref 10–38)
BASOPHILS # BLD: 0.04 K/UL (ref 0–0.1)
BASOPHILS NFR BLD: 0.6 % (ref 0–2)
BILIRUB SERPL-MCNC: 1 MG/DL (ref 0.2–1)
BUN SERPL-MCNC: 25 MG/DL (ref 6–23)
BUN/CREAT SERPL: 21 (ref 12–20)
CALCIUM SERPL-MCNC: 9.7 MG/DL (ref 8.5–10.1)
CHLORIDE SERPL-SCNC: 98 MMOL/L (ref 98–107)
CO2 SERPL-SCNC: 29 MMOL/L (ref 21–32)
CREAT SERPL-MCNC: 1.21 MG/DL (ref 0.6–1.3)
DIFFERENTIAL METHOD BLD: ABNORMAL
ECHO AO ROOT DIAM: 3.9 CM
ECHO AO ROOT INDEX: 1.67 CM/M2
ECHO AV AREA PEAK VELOCITY: 1.5 CM2
ECHO AV AREA VTI: 1.5 CM2
ECHO AV AREA/BSA PEAK VELOCITY: 0.6 CM2/M2
ECHO AV AREA/BSA VTI: 0.6 CM2/M2
ECHO AV MEAN GRADIENT: 9 MMHG
ECHO AV MEAN VELOCITY: 1.4 M/S
ECHO AV PEAK GRADIENT: 17 MMHG
ECHO AV PEAK VELOCITY: 2.1 M/S
ECHO AV VELOCITY RATIO: 0.48
ECHO AV VTI: 31 CM
ECHO BSA: 2.35 M2
ECHO EST RA PRESSURE: 3 MMHG
ECHO LV EDV A2C: 196 ML
ECHO LV EDV A4C: 183 ML
ECHO LV EDV BP: 188 ML (ref 67–155)
ECHO LV EDV INDEX A4C: 79 ML/M2
ECHO LV EDV INDEX BP: 81 ML/M2
ECHO LV EDV NDEX A2C: 84 ML/M2
ECHO LV EF PHYSICIAN: 40 %
ECHO LV EJECTION FRACTION A2C: 42 %
ECHO LV EJECTION FRACTION A4C: 48 %
ECHO LV EJECTION FRACTION BIPLANE: 45 % (ref 55–100)
ECHO LV ESV A2C: 112 ML
ECHO LV ESV A4C: 95 ML
ECHO LV ESV BP: 104 ML (ref 22–58)
ECHO LV ESV INDEX A2C: 48 ML/M2
ECHO LV ESV INDEX A4C: 41 ML/M2
ECHO LV ESV INDEX BP: 45 ML/M2
ECHO LV FRACTIONAL SHORTENING: 20 % (ref 28–44)
ECHO LV INTERNAL DIMENSION DIASTOLE INDEX: 2.53 CM/M2
ECHO LV INTERNAL DIMENSION DIASTOLIC: 5.9 CM (ref 4.2–5.9)
ECHO LV INTERNAL DIMENSION SYSTOLIC INDEX: 2.02 CM/M2
ECHO LV INTERNAL DIMENSION SYSTOLIC: 4.7 CM
ECHO LV IVSD: 0.8 CM (ref 0.6–1)
ECHO LV MASS 2D: 224.6 G (ref 88–224)
ECHO LV MASS INDEX 2D: 96.4 G/M2 (ref 49–115)
ECHO LV POSTERIOR WALL DIASTOLIC: 1.1 CM (ref 0.6–1)
ECHO LV RELATIVE WALL THICKNESS RATIO: 0.37
ECHO LVOT AREA: 3.1 CM2
ECHO LVOT AV VTI INDEX: 0.48
ECHO LVOT DIAM: 2 CM
ECHO LVOT MEAN GRADIENT: 2 MMHG
ECHO LVOT PEAK GRADIENT: 4 MMHG
ECHO LVOT PEAK VELOCITY: 1 M/S
ECHO LVOT STROKE VOLUME INDEX: 19.9 ML/M2
ECHO LVOT SV: 46.5 ML
ECHO LVOT VTI: 14.8 CM
ECHO RV TAPSE: 2.1 CM (ref 1.7–?)
EOSINOPHIL # BLD: 0.14 K/UL (ref 0–0.4)
EOSINOPHIL NFR BLD: 2 % (ref 0–5)
ERYTHROCYTE [DISTWIDTH] IN BLOOD BY AUTOMATED COUNT: 13.2 % (ref 11.6–14.5)
GLOBULIN SER CALC-MCNC: 2.9 G/DL (ref 2–4)
GLUCOSE SERPL-MCNC: 131 MG/DL (ref 74–108)
HCT VFR BLD AUTO: 47.6 % (ref 36–48)
HGB BLD-MCNC: 16.3 G/DL (ref 13–16)
IMM GRANULOCYTES # BLD AUTO: 0.01 K/UL (ref 0–0.04)
IMM GRANULOCYTES NFR BLD AUTO: 0.1 % (ref 0–0.5)
INR PPP: 2 (ref 0.9–1.1)
LYMPHOCYTES # BLD: 1.83 K/UL (ref 0.9–3.6)
LYMPHOCYTES NFR BLD: 26.2 % (ref 21–52)
MCH RBC QN AUTO: 32.5 PG (ref 24–34)
MCHC RBC AUTO-ENTMCNC: 34.2 G/DL (ref 31–37)
MCV RBC AUTO: 95 FL (ref 78–100)
MONOCYTES # BLD: 0.59 K/UL (ref 0.05–1.2)
MONOCYTES NFR BLD: 8.5 % (ref 3–10)
NEUTS SEG # BLD: 4.37 K/UL (ref 1.8–8)
NEUTS SEG NFR BLD: 62.6 % (ref 40–73)
NRBC # BLD: 0 K/UL (ref 0–0.01)
NRBC BLD-RTO: 0 PER 100 WBC
PLATELET # BLD AUTO: 217 K/UL (ref 135–420)
PMV BLD AUTO: 9.3 FL (ref 9.2–11.8)
POTASSIUM SERPL-SCNC: 4.2 MMOL/L (ref 3.5–5.5)
PROT SERPL-MCNC: 6.7 G/DL (ref 6.4–8.2)
PROTHROMBIN TIME: 23.4 SEC (ref 11.9–14.9)
RBC # BLD AUTO: 5.01 M/UL (ref 4.35–5.65)
SODIUM SERPL-SCNC: 138 MMOL/L (ref 136–145)
TROPONIN T SERPL HS-MCNC: 23.4 NG/L (ref 0–22)
TROPONIN T SERPL HS-MCNC: 29.7 NG/L (ref 0–22)
WBC # BLD AUTO: 7 K/UL (ref 4.6–13.2)

## 2025-06-28 PROCEDURE — 6370000000 HC RX 637 (ALT 250 FOR IP): Performed by: INTERNAL MEDICINE

## 2025-06-28 PROCEDURE — 85610 PROTHROMBIN TIME: CPT

## 2025-06-28 PROCEDURE — 99223 1ST HOSP IP/OBS HIGH 75: CPT | Performed by: INTERNAL MEDICINE

## 2025-06-28 PROCEDURE — 2500000003 HC RX 250 WO HCPCS: Performed by: INTERNAL MEDICINE

## 2025-06-28 PROCEDURE — 93321 DOPPLER ECHO F-UP/LMTD STD: CPT

## 2025-06-28 PROCEDURE — 6360000002 HC RX W HCPCS: Performed by: INTERNAL MEDICINE

## 2025-06-28 PROCEDURE — 99232 SBSQ HOSP IP/OBS MODERATE 35: CPT | Performed by: NURSE PRACTITIONER

## 2025-06-28 PROCEDURE — 93005 ELECTROCARDIOGRAM TRACING: CPT | Performed by: INTERNAL MEDICINE

## 2025-06-28 PROCEDURE — 85025 COMPLETE CBC W/AUTO DIFF WBC: CPT

## 2025-06-28 PROCEDURE — 36415 COLL VENOUS BLD VENIPUNCTURE: CPT

## 2025-06-28 PROCEDURE — 84484 ASSAY OF TROPONIN QUANT: CPT

## 2025-06-28 PROCEDURE — 94761 N-INVAS EAR/PLS OXIMETRY MLT: CPT

## 2025-06-28 PROCEDURE — 6360000004 HC RX CONTRAST MEDICATION

## 2025-06-28 PROCEDURE — 1100000003 HC PRIVATE W/ TELEMETRY

## 2025-06-28 PROCEDURE — 97161 PT EVAL LOW COMPLEX 20 MIN: CPT

## 2025-06-28 PROCEDURE — 80053 COMPREHEN METABOLIC PANEL: CPT

## 2025-06-28 RX ORDER — WARFARIN SODIUM 5 MG/1
5 TABLET ORAL
Status: DISCONTINUED | OUTPATIENT
Start: 2025-06-28 | End: 2025-06-28

## 2025-06-28 RX ADMIN — SULFUR HEXAFLUORIDE 2 ML: KIT at 14:36

## 2025-06-28 RX ADMIN — Medication 400 MG: at 09:45

## 2025-06-28 RX ADMIN — Medication 600 MG: at 13:47

## 2025-06-28 RX ADMIN — CARVEDILOL 25 MG: 25 TABLET, FILM COATED ORAL at 20:36

## 2025-06-28 RX ADMIN — Medication 600 MG: at 17:34

## 2025-06-28 RX ADMIN — PANTOPRAZOLE SODIUM 40 MG: 40 TABLET, DELAYED RELEASE ORAL at 09:45

## 2025-06-28 RX ADMIN — Medication 1000 UNITS: at 09:45

## 2025-06-28 RX ADMIN — SODIUM CHLORIDE, PRESERVATIVE FREE 10 ML: 5 INJECTION INTRAVENOUS at 09:48

## 2025-06-28 RX ADMIN — LISINOPRIL 20 MG: 20 TABLET ORAL at 09:45

## 2025-06-28 RX ADMIN — SODIUM CHLORIDE, PRESERVATIVE FREE 10 ML: 5 INJECTION INTRAVENOUS at 20:37

## 2025-06-28 RX ADMIN — SPIRONOLACTONE 25 MG: 25 TABLET ORAL at 09:45

## 2025-06-28 RX ADMIN — Medication 600 MG: at 09:45

## 2025-06-28 RX ADMIN — CARVEDILOL 25 MG: 25 TABLET, FILM COATED ORAL at 09:45

## 2025-06-28 RX ADMIN — FUROSEMIDE 40 MG: 10 INJECTION, SOLUTION INTRAMUSCULAR; INTRAVENOUS at 09:00

## 2025-06-28 RX ADMIN — ASPIRIN 81 MG: 81 TABLET, COATED ORAL at 09:45

## 2025-06-28 ASSESSMENT — PAIN SCALES - GENERAL
PAINLEVEL_OUTOF10: 0

## 2025-06-28 NOTE — PROGRESS NOTES
Occupational Therapy Screen    Patient: Donavan Hidalgo (65 y.o. male)  Date: 6/28/2025  Diagnosis: CHF (congestive heart failure) ([facility]) [I50.9]  Acute on chronic systolic CHF (congestive heart failure) (HCC) [I50.23] Acute on chronic systolic CHF (congestive heart failure) (HCC)        OT order received and chart reviewed. AMPAC completed via chart review, discussion with PT. Per PT pt has been walking to the BR and reports being at his baseline for ADLs and functional mobility.    [facility] AM-PAC® Daily Activity Inpatient Short Form (6-Clicks)     How much difficulty does the patient currently have     Total    A Lot   A Little    None    Putting on and taking off regular lower body clothing?    [] 1 [] 2 [] 3 [x] 4   2. Bathing (which includes washing, rinsing, drying)?     [] 1 [] 2 [] 3 [x] 4   3. Toileting (which includes using toilet, bedpan, or urinal)?    [] 1 [] 2 [] 3 [x] 4   4. Putting on and taking off regular upper body clothing?    [] 1 [] 2 [] 3 [x] 4   5. Taking care of personal grooming?    [] 1 [] 2 [] 3 [x] 4   6. Eating meals?    [] 1 [] 2 [] 3 [x] 4                                                                  Total 24      Patient presents at functional baseline this date for ADL/IADL performance. Formal OT evaluation is not indicated. OT will sign off. If patient has a change in medical status or becomes medically appropriate for skilled occupational therapy, please re-order services. Thank you.      Michael Enamorado OTR/JUSTEN

## 2025-06-28 NOTE — CONSULTS
[facility] - Consult Note    Cardiology consultation request from Dr. Li for evaluation and management/treatment of abnormal stress test    Date of  Admission: 6/27/2025  5:32 PM   Primary Care Physician:  Piyush English MD    Attending Cardiologist: Dr. Calvo       Assessment:     - Abnormal nuclear stress test on 6/26/25  EF 23%, no evidence of inducible ischemia. Medium to large perfusion defect in the apical septal segments.   - CAD with CABG x2 LIMA to LAD, SVG diagonal branch 9/2019  - Chronic HFrEF/Ischemic cardiomyopathy. Does not appear volume overloaded. CXR without evidence of CHF, nt pro bnp 573. On oral lasix 40mg prn as outpatient.  Stress test 6/25 with EF 23%  Echo 3/8/25 EF 50-55%, apical septa and apex hypoK. Grade I DD.   GDMT with coreg, aldactone, lisinopril  - Aortic stenosis s/p AVR 9/2019  - pAF with JOVANI 9/2019. OAC with Warfarin. Goal INR 2-3. Rate control with coreg 12.5mg BID  - Sick sinus syndrome. PPM initially implanted in 2011. Patient underwent system extraction with D-PPM reimplantation 6/2020.  - HTN  - Dyslipidemia. Intolerant to statins.  On Repatha  - TOMEKA, not on CPAP  - GERD  - Hx of melanoma    Primary cardiologist: Dr. Gardner     Plan:     Addendum: Independently seen and evaluated.  Agree with below.  Patient has known history of aortic valve replacement as well as LIMA to LAD sequential to diagonal open heart surgery in September 2019.  His echocardiogram in March 2025 revealed EF 50-55% with apical/septal hypokinesis.  However, his nuclear scan revealed EF 23%; oftentimes, nuclear EF can underestimate.  Nevertheless, we will proceed with further workup in light of the fact that he has noted worsening fatigue, and exercise capacity over the last several months.  Will repeat limited echocardiogram.  If he remains without unstable symptoms, we will plan on proceeding on with left heart catheterization and coronary angiography on Monday.  This was

## 2025-06-28 NOTE — PROGRESS NOTES
Spiritual Health History and Assessment/Progress Note  [facility]    Spiritual/Emotional Needs,  ,  ,      Name: Donavan Hidalgo MRN: [id number]    Age: 65 y.o.     Sex: male   Language: English   Mu-ism: Religion   Acute on chronic systolic CHF (congestive heart failure) (HCC)     Date: 6/28/2025            Total Time Calculated: 10 min              Spiritual Assessment began in [facility] 2S TELEMETRY        Referral/Consult From: Rounding   Encounter Overview/Reason: Spiritual/Emotional Needs  Service Provided For: Patient    Aranza, Belief, Meaning:   Patient identifies as spiritual and has beliefs or practices that help with coping during difficult times  Family/Friends No family/friends present      Importance and Influence:  Patient has spiritual/personal beliefs that influence decisions regarding their health  Family/Friends No family/friends present    Community:  Patient feels well-supported. Support system includes: Spouse/Partner  Family/Friends No family/friends present    Assessment and Plan of Care:     Patient Interventions include: Facilitated expression of thoughts and feelings, Explored spiritual coping/struggle/distress, Affirmed coping skills/support systems, and Provided sacramental/Moravian ritual  Family/Friends Interventions include: No family/friends present    Patient Plan of Care: Spiritual Care available upon further referral  Family/Friends Plan of Care: No family/friends present    Electronically signed by Chaplain Shawn on 6/28/2025 at 12:35 PM

## 2025-06-28 NOTE — CARE COORDINATION
shower   Bathroom Toilet Standard   Bathroom Equipment None   Bathroom Accessibility Accessible   Home Equipment None   Receives Help From Family   Prior Level of Assist for ADLs Independent   Prior Level of Assist for Homemaking Independent   Homemaking Responsibilities Yes   Ambulation Assistance Independent   Prior Level of Assist for Transfers Independent   Active  Yes   Mode of Transportation Car   Education n/a   Occupation Retired   Type of Occupation    Discharge Planning   Type of Residence House   Living Arrangements Spouse/Significant Other   Current Services Prior To Admission None   Potential Assistance Needed N/A   DME Ordered? No   Potential Assistance Purchasing Medications No   Type of Home Care Services None   Patient expects to be discharged to: Unknown   One/Two Story Residence One story   Services At/After Discharge   Transition of Care Consult (CM Consult) N/A    Resource Information Provided? No   Mode of Transport at Discharge Other (see comment)  (Spouse to transport patient home at time of discharge.)   Confirm Follow Up Transport Self   Condition of Participation: Discharge Planning   The Plan for Transition of Care is related to the following treatment goals: Home with family support and appropriate resources   The Patient and/or Patient Representative was provided with a Choice of Provider? Patient   The Patient and/Or Patient Representative agree with the Discharge Plan? Yes   Freedom of Choice list was provided with basic dialogue that supports the patient's individualized plan of care/goals, treatment preferences, and shares the quality data associated with the providers?  Yes     Marium REYES,RN, [facility]  Case Management  [phone]

## 2025-06-28 NOTE — PROGRESS NOTES
Consult for Warfarin Dosing by Pharmacy by Dr. Li  Consult provided for this 65 y.o.  male , for indication of Bioprosthetic Aortic Valve Replacement    Dose to achieve an INR goal of 2 to 3  Home dose: 5 mg daily on Mondays, Weds, Friday and Saturdays and followed with 3 mg on Tuesday, Thursday, and Sundays    PTA INR = 2.3 on 6/26/25 at [facility] ED visit    [facility] Dosing Services    Warfarin  5 (mg) to be given today at 18:00.     DATE INR DOSE   6/26 2.3 3mg presumed   6/27 2.0 5 mg   6/28 2.0 5 mg       Significant drug interactions: None  PT/INR Lab Results   Component Value Date/Time    INR 2.0 06/28/2025 06:46 AM       Platelets Lab Results   Component Value Date/Time     06/28/2025 06:46 AM      H/H Lab Results   Component Value Date/Time    HGB 16.3 06/28/2025 06:46 AM        Pharmacy to follow daily and will provide subsequent Warfarin dosing based on clinical status.     Signed ROSSY SARGENT RPH

## 2025-06-28 NOTE — PROGRESS NOTES
Consult for Warfarin Dosing by Pharmacy by Dr. Li  Consult provided for this 65 y.o.  male , for indication of Bioprosthetic Aortic Valve Replacement    Dose to achieve an INR goal of 2 to 3  Home dose: 5 mg daily on Mondays, Weds, Friday and Saturdays and followed with 3 mg on Tuesday, Thursday, and Sundays    Last INR = 2.3 on 6/26/25 at [facility] ED visit    [facility] Pharm[facility] Dosing Services    Warfarin  5 (mg) to be given today at 18:00.     DATE INR DOSE   6/26 2.3 3mg presumed   6/27 2.0 5 mg       Significant drug interactions: None  PT/INR Lab Results   Component Value Date/Time    INR 2.0 06/27/2025 07:15 PM       Platelets Lab Results   Component Value Date/Time     06/27/2025 06:14 PM      H/H Lab Results   Component Value Date/Time    HGB 16.4 06/27/2025 06:14 PM        Pharmacy to follow daily and will provide subsequent Warfarin dosing based on clinical status.     Signed MABLE BENZ RPH

## 2025-06-28 NOTE — PROGRESS NOTES
Physical Therapy  PHYSICAL THERAPY EVALUATION/DISCHARGE    Patient: Donavan Hidalgo (65 y.o. male)  Date: 6/28/2025  Primary Diagnosis: CHF (congestive heart failure) ([facility]) [I50.9]  Acute on chronic systolic CHF (congestive heart failure) (HCC) [I50.23]       Precautions: None,   PLOF: Independent, lives in 1 story home with wife, 4 steps to enter with hand rails, works as an      ASSESSMENT AND RECOMMENDATIONS:  Pt cleared by nursing prior to session. Pt sitting up in bed, in NAD and agreeable to eval. Pt was independent for all bed mobility and transfers. Pt independent with ambulating around the hospital room safely. Pt denied any concerns regarding safety returning to home setting and agreed to being at PLOF before admission. All needs left in reach. Patient does not require further skilled physical therapy intervention at this level of care.    Further Equipment Recommendations for Discharge: None    [facility]: AM-PAC Inpatient Mobility Raw Score : 24      Current research shows that an AM-PAC score of 18 (14 without stairs) or greater is associated with a discharge to the patient's home setting. and At this time and based on an AM-PAC score, no further PT is recommended upon discharge.  Recommend patient returns to prior setting with prior services.    This [facility] score should be considered in conjunction with interdisciplinary team recommendations to determine the most appropriate discharge setting. Patient's social support, diagnosis, medical stability, and prior level of function should also be taken into consideration.     SUBJECTIVE:   Patient stated “They are trying to figure out what is going on with me.”    OBJECTIVE DATA SUMMARY:     Past Medical History:   Diagnosis Date    Aortic stenosis, mild 5/21/2012    Benign hypertensive heart disease without heart failure 10/20/2011    Cancer (HCC)     malignant melanoma removed    Diverticular disease     GERD (gastroesophageal reflux

## 2025-06-28 NOTE — PROGRESS NOTES
[facility] Hospitalist Group  Progress Note    Patient: Donavan Hidalgo Age: 65 y.o. : 1960 MR#: [id number] SSN: [id number]  Date: 2025         Assessment/Plan:     Hospital Problems           Last Modified POA    * (Principal) Acute on chronic systolic CHF (congestive heart failure) ([facility]) 2025 Yes     Acute on Chronic Systolic Congestive Heart Failure   - LVEF 23% Post-Stress Ejection Fraction from a Nuclear Cardiac Stress Test performed on 2025   - Patient reports intolerance to Sitagliptin-Metformin   - Strict I/Os, Daily Weight, Low Sodium Diet, Fluid Restriction 1.8 L/day, Telemetry  - s/p IV Furosemide 40 mg BID (home diuretic: PO Furosemide 40 mg qday PRN), continue Spironolactone  - Cardiological services following, appreciate reccs  - Pending Echo     Positive Nuclear Cardiac Stress Test with likely Critical Coronary Artery Disease and concern for Ischemic Cardiomyopathy  - Cardiological services is consulted  - Pending [facility]   - Pending ECHO as noted above      Elevated Troponin  - Troponin-T 27.2 ng/L, 23.4  - Denies chest pain  - Continue Telemetry     Suspected Obstructive Sleep Apnea (a.k.a. TOMEKA)  - Patient reports he does not have a CPAP at home  - Monitor     Aortic Valve Replacement; Long-Term Anticoagulation (Warfarin)  - Target INR: 2.0 to 3.0  - Warfarin dosing per pharmacy      Sick Sinus Syndrome, Status-Post, Pacemaker Placement  - Monitor     Primary Hypertension  - Continue home coreg, lisinopril, aldactone   - Monitor b/p adjust medications accordingly      Hyperlipidemia; Statin Intolerance  - Patient takes Evolocumab (a.k.a. Repatha).     Pre-Diabetes  - Monitor BS     Gastroesophageal Reflux Disease   - Continue PPI      History of Syncope  - Monitor     History of (a.k.a. H/O) Malignant Melanoma  - Monitor      DVT Prophylaxis:  []Lovenox  []Hep SQ  []SCDs  [x]Coumadin   []On Heparin gtt []PO anticoagulant    Anticipated

## 2025-06-29 LAB
ALBUMIN SERPL-MCNC: 3.6 G/DL (ref 3.4–5)
ALBUMIN/GLOB SERPL: 1.3 (ref 0.8–1.7)
ALP SERPL-CCNC: 70 U/L (ref 45–117)
ALT SERPL-CCNC: 14 U/L (ref 10–50)
ANION GAP SERPL CALC-SCNC: 11 MMOL/L (ref 3–18)
AST SERPL-CCNC: 20 U/L (ref 10–38)
BASOPHILS # BLD: 0.04 K/UL (ref 0–0.1)
BASOPHILS NFR BLD: 0.5 % (ref 0–2)
BILIRUB SERPL-MCNC: 0.7 MG/DL (ref 0.2–1)
BUN SERPL-MCNC: 29 MG/DL (ref 6–23)
BUN/CREAT SERPL: 21 (ref 12–20)
CALCIUM SERPL-MCNC: 9.5 MG/DL (ref 8.5–10.1)
CHLORIDE SERPL-SCNC: 99 MMOL/L (ref 98–107)
CO2 SERPL-SCNC: 28 MMOL/L (ref 21–32)
CREAT SERPL-MCNC: 1.38 MG/DL (ref 0.6–1.3)
DIFFERENTIAL METHOD BLD: NORMAL
EKG ATRIAL RATE: 61 BPM
EKG DIAGNOSIS: NORMAL
EKG P AXIS: 73 DEGREES
EKG P-R INTERVAL: 250 MS
EKG Q-T INTERVAL: 412 MS
EKG QRS DURATION: 104 MS
EKG QTC CALCULATION (BAZETT): 414 MS
EKG R AXIS: -56 DEGREES
EKG T AXIS: 100 DEGREES
EKG VENTRICULAR RATE: 61 BPM
EOSINOPHIL # BLD: 0.22 K/UL (ref 0–0.4)
EOSINOPHIL NFR BLD: 2.8 % (ref 0–5)
ERYTHROCYTE [DISTWIDTH] IN BLOOD BY AUTOMATED COUNT: 13.1 % (ref 11.6–14.5)
GLOBULIN SER CALC-MCNC: 2.8 G/DL (ref 2–4)
GLUCOSE SERPL-MCNC: 127 MG/DL (ref 74–108)
HCT VFR BLD AUTO: 47.1 % (ref 36–48)
HGB BLD-MCNC: 15.8 G/DL (ref 13–16)
IMM GRANULOCYTES # BLD AUTO: 0.02 K/UL (ref 0–0.04)
IMM GRANULOCYTES NFR BLD AUTO: 0.3 % (ref 0–0.5)
INR PPP: 2.3 (ref 0.9–1.1)
LYMPHOCYTES # BLD: 2.11 K/UL (ref 0.9–3.6)
LYMPHOCYTES NFR BLD: 27.2 % (ref 21–52)
MCH RBC QN AUTO: 32.2 PG (ref 24–34)
MCHC RBC AUTO-ENTMCNC: 33.5 G/DL (ref 31–37)
MCV RBC AUTO: 96.1 FL (ref 78–100)
MONOCYTES # BLD: 0.73 K/UL (ref 0.05–1.2)
MONOCYTES NFR BLD: 9.4 % (ref 3–10)
NEUTS SEG # BLD: 4.63 K/UL (ref 1.8–8)
NEUTS SEG NFR BLD: 59.8 % (ref 40–73)
NRBC # BLD: 0 K/UL (ref 0–0.01)
NRBC BLD-RTO: 0 PER 100 WBC
PLATELET # BLD AUTO: 226 K/UL (ref 135–420)
PMV BLD AUTO: 9.8 FL (ref 9.2–11.8)
POTASSIUM SERPL-SCNC: 4.2 MMOL/L (ref 3.5–5.5)
PROT SERPL-MCNC: 6.4 G/DL (ref 6.4–8.2)
PROTHROMBIN TIME: 25.6 SEC (ref 11.9–14.9)
RBC # BLD AUTO: 4.9 M/UL (ref 4.35–5.65)
SODIUM SERPL-SCNC: 138 MMOL/L (ref 136–145)
WBC # BLD AUTO: 7.8 K/UL (ref 4.6–13.2)

## 2025-06-29 PROCEDURE — 80053 COMPREHEN METABOLIC PANEL: CPT

## 2025-06-29 PROCEDURE — 85025 COMPLETE CBC W/AUTO DIFF WBC: CPT

## 2025-06-29 PROCEDURE — 6370000000 HC RX 637 (ALT 250 FOR IP): Performed by: INTERNAL MEDICINE

## 2025-06-29 PROCEDURE — 2500000003 HC RX 250 WO HCPCS: Performed by: INTERNAL MEDICINE

## 2025-06-29 PROCEDURE — 85610 PROTHROMBIN TIME: CPT

## 2025-06-29 PROCEDURE — 36415 COLL VENOUS BLD VENIPUNCTURE: CPT

## 2025-06-29 PROCEDURE — 93010 ELECTROCARDIOGRAM REPORT: CPT | Performed by: INTERNAL MEDICINE

## 2025-06-29 PROCEDURE — 99232 SBSQ HOSP IP/OBS MODERATE 35: CPT | Performed by: INTERNAL MEDICINE

## 2025-06-29 PROCEDURE — 1100000003 HC PRIVATE W/ TELEMETRY

## 2025-06-29 RX ORDER — AMLODIPINE BESYLATE 5 MG/1
5 TABLET ORAL DAILY
Status: DISCONTINUED | OUTPATIENT
Start: 2025-06-29 | End: 2025-06-30 | Stop reason: HOSPADM

## 2025-06-29 RX ORDER — FUROSEMIDE 20 MG/1
20 TABLET ORAL DAILY
Status: DISCONTINUED | OUTPATIENT
Start: 2025-06-29 | End: 2025-06-30 | Stop reason: HOSPADM

## 2025-06-29 RX ORDER — LABETALOL HYDROCHLORIDE 5 MG/ML
10 INJECTION, SOLUTION INTRAVENOUS EVERY 6 HOURS PRN
Status: DISCONTINUED | OUTPATIENT
Start: 2025-06-29 | End: 2025-06-30 | Stop reason: HOSPADM

## 2025-06-29 RX ADMIN — Medication 600 MG: at 11:15

## 2025-06-29 RX ADMIN — Medication 1000 UNITS: at 08:49

## 2025-06-29 RX ADMIN — FUROSEMIDE 20 MG: 20 TABLET ORAL at 11:14

## 2025-06-29 RX ADMIN — Medication 600 MG: at 06:24

## 2025-06-29 RX ADMIN — LISINOPRIL 20 MG: 20 TABLET ORAL at 08:49

## 2025-06-29 RX ADMIN — AMLODIPINE BESYLATE 5 MG: 5 TABLET ORAL at 11:15

## 2025-06-29 RX ADMIN — ASPIRIN 81 MG: 81 TABLET, COATED ORAL at 08:49

## 2025-06-29 RX ADMIN — SPIRONOLACTONE 25 MG: 25 TABLET ORAL at 08:49

## 2025-06-29 RX ADMIN — PANTOPRAZOLE SODIUM 40 MG: 40 TABLET, DELAYED RELEASE ORAL at 06:24

## 2025-06-29 RX ADMIN — CARVEDILOL 25 MG: 25 TABLET, FILM COATED ORAL at 22:52

## 2025-06-29 RX ADMIN — TRAZODONE HYDROCHLORIDE 50 MG: 50 TABLET ORAL at 22:52

## 2025-06-29 RX ADMIN — Medication 400 MG: at 08:49

## 2025-06-29 RX ADMIN — CARVEDILOL 25 MG: 25 TABLET, FILM COATED ORAL at 08:49

## 2025-06-29 RX ADMIN — SODIUM CHLORIDE, PRESERVATIVE FREE 10 ML: 5 INJECTION INTRAVENOUS at 22:54

## 2025-06-29 RX ADMIN — SODIUM CHLORIDE, PRESERVATIVE FREE 10 ML: 5 INJECTION INTRAVENOUS at 08:54

## 2025-06-29 RX ADMIN — Medication 600 MG: at 17:29

## 2025-06-29 ASSESSMENT — PAIN SCALES - GENERAL
PAINLEVEL_OUTOF10: 0

## 2025-06-29 NOTE — PROGRESS NOTES
[facility] Hospitalist Group  Progress Note    Patient: Donavan Hidalgo Age: 65 y.o. : 1960 MR#: [id number] SSN: [id number]  Date: 2025         Assessment/Plan:     Hospital Problems           Last Modified POA    * (Principal) Acute on chronic systolic CHF (congestive heart failure) ([facility]) 2025 Yes    Cardiomyopathy ([facility]) 2025 Yes     Acute on Chronic Systolic Congestive Heart Failure   - LVEF 23% Post-Stress Ejection Fraction from a Nuclear Cardiac Stress Test performed on 2025   - Patient reports intolerance to Sitagliptin-Metformin   - Strict I/Os, Daily Weight, Low Sodium Diet, Fluid Restriction 1.8 L/day, Telemetry  - s/p IV Furosemide 40 mg BID (home diuretic: PO Furosemide 40 mg qday PRN), continue Spironolactone  - Cardiological services following, appreciate reccs  - Echo EF 45%      Positive Nuclear Cardiac Stress Test with likely Critical Coronary Artery Disease and concern for Ischemic Cardiomyopathy  - Cardiological services is consulted  - Pending [facility]  if INR improves, if not, to d/c in am and return as OP for cath   - ECHO as noted above      Elevated Troponin  - Troponin-T 27.2 ng/L, 23.4  - Denies chest pain  - Continue Telemetry     Suspected Obstructive Sleep Apnea (a.k.a. TOMEKA)  - Patient reports he does not have a CPAP at home  - Monitor     Aortic Valve Replacement; Long-Term Anticoagulation (Warfarin)  - Target INR: 2.0 to 3.0  - Warfarin dosing per pharmacy, held due to elevated INR     Sick Sinus Syndrome, Status-Post, Pacemaker Placement  - Monitor     Primary Hypertension  - Continue home coreg, lisinopril, aldactone   - Labetolol for SBP > 160  - Monitor b/p adjust medications accordingly      Hyperlipidemia; Statin Intolerance  - Patient takes Evolocumab (a.k.a. Repatha).     Pre-Diabetes  - Monitor BS     Gastroesophageal Reflux Disease   - Continue PPI      History of Syncope  - Monitor     History of (a.k.a. H/O)

## 2025-06-29 NOTE — PROGRESS NOTES
[facility]  -  Progress Note      Patient: Donavan Hidalgo MRN: [id number]  SSN: [id number]    YOB: 1960  Age: 65 y.o.  Sex: male      Admit Date: 6/27/2025    Assessment:     - Abnormal nuclear stress test on 6/26/25  EF 23%, no evidence of inducible ischemia. Medium to large perfusion defect in the apical septal segments.   - CAD with CABG x2 LIMA to LAD, SVG diagonal branch 9/2019  - Chronic HFrEF/Ischemic cardiomyopathy. Does not appear volume overloaded. CXR without evidence of CHF, nt pro bnp 573. On oral lasix 40mg prn as outpatient.  Stress test 6/25 with EF 23%  Echo 3/8/25 EF 50-55%, apical septa and apex hypoK. Grade I DD.   GDMT with coreg, aldactone, lisinopril  - Aortic stenosis s/p AVR 9/2019  - pAF with JOVANI 9/2019. OAC with Warfarin. Goal INR 2-3. Rate control with coreg 12.5mg BID  - Sick sinus syndrome. PPM initially implanted in 2011. Patient underwent system extraction with D-PPM reimplantation 6/2020.  - HTN  - Dyslipidemia. Intolerant to statins.  On Repatha  - TOMEKA, not on CPAP  - GERD  - Hx of melanoma     Primary cardiologist: Dr. Gardner    Plan:     He has no symptoms of chest pains or shortness of breath.  His echocardiogram yesterday showed EF 45% with distal anterior regional wall motion abnormality.  Agree with proceeding on with coronary angiography.  He has no evidence of unstable angina currently.    The plan is to proceed with coronary angiography, using right groin approach, when his INR is below 1.5.  If his INR tomorrow morning is decreased, we will proceed with coronary angiography.  If the INR is above comfort level for groin approach, consideration can be given for patient to go to their local friend's house and get his INR checked on a daily basis until his INR is less than 1.4; he will need to be on Lovenox while his INR decreases.    This was discussed at length with the patient and his wife.  They are in agreement.  For his increased

## 2025-06-30 VITALS
HEART RATE: 61 BPM | OXYGEN SATURATION: 96 % | TEMPERATURE: 97.9 F | RESPIRATION RATE: 18 BRPM | SYSTOLIC BLOOD PRESSURE: 142 MMHG | WEIGHT: 231 LBS | DIASTOLIC BLOOD PRESSURE: 96 MMHG | HEIGHT: 75 IN | BODY MASS INDEX: 28.72 KG/M2

## 2025-06-30 LAB
ANION GAP SERPL CALC-SCNC: 12 MMOL/L (ref 3–18)
BASOPHILS # BLD: 0.04 K/UL (ref 0–0.1)
BASOPHILS NFR BLD: 0.6 % (ref 0–2)
BUN SERPL-MCNC: 24 MG/DL (ref 6–23)
BUN/CREAT SERPL: 20 (ref 12–20)
CALCIUM SERPL-MCNC: 9 MG/DL (ref 8.5–10.1)
CHLORIDE SERPL-SCNC: 100 MMOL/L (ref 98–107)
CO2 SERPL-SCNC: 26 MMOL/L (ref 21–32)
CREAT SERPL-MCNC: 1.2 MG/DL (ref 0.6–1.3)
DIFFERENTIAL METHOD BLD: ABNORMAL
EOSINOPHIL # BLD: 0.21 K/UL (ref 0–0.4)
EOSINOPHIL NFR BLD: 3 % (ref 0–5)
ERYTHROCYTE [DISTWIDTH] IN BLOOD BY AUTOMATED COUNT: 13 % (ref 11.6–14.5)
GLUCOSE SERPL-MCNC: 136 MG/DL (ref 74–108)
HCT VFR BLD AUTO: 47.4 % (ref 36–48)
HGB BLD-MCNC: 16.1 G/DL (ref 13–16)
IMM GRANULOCYTES # BLD AUTO: 0.03 K/UL (ref 0–0.04)
IMM GRANULOCYTES NFR BLD AUTO: 0.4 % (ref 0–0.5)
INR PPP: 2 (ref 0.9–1.1)
LYMPHOCYTES # BLD: 1.79 K/UL (ref 0.9–3.6)
LYMPHOCYTES NFR BLD: 25.2 % (ref 21–52)
MCH RBC QN AUTO: 33.1 PG (ref 24–34)
MCHC RBC AUTO-ENTMCNC: 34 G/DL (ref 31–37)
MCV RBC AUTO: 97.5 FL (ref 78–100)
MONOCYTES # BLD: 0.6 K/UL (ref 0.05–1.2)
MONOCYTES NFR BLD: 8.5 % (ref 3–10)
NEUTS SEG # BLD: 4.42 K/UL (ref 1.8–8)
NEUTS SEG NFR BLD: 62.3 % (ref 40–73)
NRBC # BLD: 0 K/UL (ref 0–0.01)
NRBC BLD-RTO: 0 PER 100 WBC
PLATELET # BLD AUTO: 187 K/UL (ref 135–420)
PMV BLD AUTO: 9.7 FL (ref 9.2–11.8)
POTASSIUM SERPL-SCNC: 3.7 MMOL/L (ref 3.5–5.5)
PROTHROMBIN TIME: 22.9 SEC (ref 11.9–14.9)
RBC # BLD AUTO: 4.86 M/UL (ref 4.35–5.65)
SODIUM SERPL-SCNC: 138 MMOL/L (ref 136–145)
WBC # BLD AUTO: 7.1 K/UL (ref 4.6–13.2)

## 2025-06-30 PROCEDURE — 6370000000 HC RX 637 (ALT 250 FOR IP): Performed by: INTERNAL MEDICINE

## 2025-06-30 PROCEDURE — 85025 COMPLETE CBC W/AUTO DIFF WBC: CPT

## 2025-06-30 PROCEDURE — C1713 ANCHOR/SCREW BN/BN,TIS/BN: HCPCS | Performed by: INTERNAL MEDICINE

## 2025-06-30 PROCEDURE — 85610 PROTHROMBIN TIME: CPT

## 2025-06-30 PROCEDURE — 76000 FLUOROSCOPY <1 HR PHYS/QHP: CPT | Performed by: INTERNAL MEDICINE

## 2025-06-30 PROCEDURE — 93458 L HRT ARTERY/VENTRICLE ANGIO: CPT | Performed by: INTERNAL MEDICINE

## 2025-06-30 PROCEDURE — 2709999900 HC NON-CHARGEABLE SUPPLY: Performed by: INTERNAL MEDICINE

## 2025-06-30 PROCEDURE — 80048 BASIC METABOLIC PNL TOTAL CA: CPT

## 2025-06-30 PROCEDURE — 99239 HOSP IP/OBS DSCHRG MGMT >30: CPT | Performed by: STUDENT IN AN ORGANIZED HEALTH CARE EDUCATION/TRAINING PROGRAM

## 2025-06-30 PROCEDURE — 36415 COLL VENOUS BLD VENIPUNCTURE: CPT

## 2025-06-30 PROCEDURE — 99232 SBSQ HOSP IP/OBS MODERATE 35: CPT | Performed by: INTERNAL MEDICINE

## 2025-06-30 RX ORDER — FUROSEMIDE 20 MG/1
20 TABLET ORAL DAILY
Qty: 60 TABLET | Refills: 3 | Status: SHIPPED | OUTPATIENT
Start: 2025-07-01

## 2025-06-30 RX ORDER — ENOXAPARIN SODIUM 100 MG/ML
100 INJECTION SUBCUTANEOUS 2 TIMES DAILY
Qty: 10 ML | Refills: 0 | Status: SHIPPED | OUTPATIENT
Start: 2025-06-30 | End: 2025-06-30 | Stop reason: SDUPTHER

## 2025-06-30 RX ORDER — AMLODIPINE BESYLATE 5 MG/1
5 TABLET ORAL DAILY
Qty: 30 TABLET | Refills: 3 | Status: SHIPPED | OUTPATIENT
Start: 2025-07-01

## 2025-06-30 RX ORDER — ENOXAPARIN SODIUM 100 MG/ML
100 INJECTION SUBCUTANEOUS 2 TIMES DAILY
Qty: 10 ML | Refills: 0 | Status: SHIPPED | OUTPATIENT
Start: 2025-06-30 | End: 2025-07-05

## 2025-06-30 RX ADMIN — LISINOPRIL 20 MG: 20 TABLET ORAL at 08:17

## 2025-06-30 RX ADMIN — SPIRONOLACTONE 25 MG: 25 TABLET ORAL at 08:21

## 2025-06-30 RX ADMIN — CARVEDILOL 25 MG: 25 TABLET, FILM COATED ORAL at 08:16

## 2025-06-30 RX ADMIN — ASPIRIN 81 MG: 81 TABLET, COATED ORAL at 08:16

## 2025-06-30 RX ADMIN — PANTOPRAZOLE SODIUM 40 MG: 40 TABLET, DELAYED RELEASE ORAL at 08:17

## 2025-06-30 RX ADMIN — FUROSEMIDE 20 MG: 20 TABLET ORAL at 08:15

## 2025-06-30 RX ADMIN — AMLODIPINE BESYLATE 5 MG: 5 TABLET ORAL at 08:17

## 2025-06-30 ASSESSMENT — PAIN SCALES - GENERAL
PAINLEVEL_OUTOF10: 0
PAINLEVEL_OUTOF10: 0

## 2025-06-30 NOTE — PLAN OF CARE
Problem: Chronic Conditions and Co-morbidities  Goal: Patient's chronic conditions and co-morbidity symptoms are monitored and maintained or improved  6/29/2025 1044 by Karol Gomez RN  Outcome: Progressing  Flowsheets (Taken 6/28/2025 0748)  Care Plan - Patient's Chronic Conditions and Co-Morbidity Symptoms are Monitored and Maintained or Improved: Monitor and assess patient's chronic conditions and comorbid symptoms for stability, deterioration, or improvement  6/29/2025 1041 by Karol Gomez RN  Outcome: Progressing  Flowsheets (Taken 6/28/2025 0748)  Care Plan - Patient's Chronic Conditions and Co-Morbidity Symptoms are Monitored and Maintained or Improved: Monitor and assess patient's chronic conditions and comorbid symptoms for stability, deterioration, or improvement  6/29/2025 0332 by Veronica Pope RN  Outcome: Progressing     Problem: Discharge Planning  Goal: Discharge to home or other facility with appropriate resources  6/29/2025 1044 by Karol Gomez RN  Outcome: Progressing  Flowsheets (Taken 6/28/2025 0748)  Discharge to home or other facility with appropriate resources: Identify barriers to discharge with patient and caregiver  6/29/2025 1041 by Karol Gomez RN  Outcome: Progressing  Flowsheets (Taken 6/28/2025 0748)  Discharge to home or other facility with appropriate resources: Identify barriers to discharge with patient and caregiver  6/29/2025 0332 by Veronica Pope RN  Outcome: Progressing     Problem: Pain  Goal: Verbalizes/displays adequate comfort level or baseline comfort level  6/29/2025 1044 by Karol Gomez RN  Outcome: Progressing  Flowsheets (Taken 6/29/2025 1041)  Verbalizes/displays adequate comfort level or baseline comfort level: Encourage patient to monitor pain and request assistance  6/29/2025 1041 by Karol Gomez RN  Outcome: Progressing  Flowsheets (Taken 6/29/2025 1041)  Verbalizes/displays adequate comfort level or baseline comfort level: Encourage patient 
  Problem: Chronic Conditions and Co-morbidities  Goal: Patient's chronic conditions and co-morbidity symptoms are monitored and maintained or improved  6/30/2025 0542 by Geri Milton RN  Outcome: Progressing  6/30/2025 0540 by Geri Milton RN  Outcome: Progressing     Problem: Discharge Planning  Goal: Discharge to home or other facility with appropriate resources  6/30/2025 0542 by Geri Milton, RN  Outcome: Progressing  6/30/2025 0540 by Geri Milton RN  Outcome: Progressing  6/30/2025 0539 by Geri Milton RN  Outcome: Progressing     Problem: Pain  Goal: Verbalizes/displays adequate comfort level or baseline comfort level  6/30/2025 0542 by Geri Milton RN  Outcome: Progressing  6/30/2025 0540 by Geri Milton, RN  Outcome: Progressing  6/30/2025 0539 by Geri Milton, RN  Outcome: Progressing     Problem: ABCDS Injury Assessment  Goal: Absence of physical injury  6/30/2025 0542 by Geri Milton RN  Outcome: Progressing  6/30/2025 0540 by Geri Milton RN  Outcome: Progressing  6/30/2025 0539 by Geri Milton RN  Outcome: Progressing     Problem: Safety - Adult  Goal: Free from fall injury  6/30/2025 0542 by Geri Milton, RN  Outcome: Progressing  6/30/2025 0540 by Geri Milton, RN  Outcome: Progressing  6/30/2025 0539 by Geri Milton, RN  Outcome: Progressing     Problem: Respiratory - Adult  Goal: Achieves optimal ventilation and oxygenation  6/30/2025 0542 by Geri Milton RN  Outcome: Progressing  6/30/2025 0540 by Geri Milton, RN  Outcome: Progressing  6/30/2025 0539 by Geri Milton RN  Outcome: Progressing     Problem: Cardiovascular - Adult  Goal: Maintains optimal cardiac output and hemodynamic stability  6/30/2025 0542 by Geri Milton RN  Outcome: Progressing  6/30/2025 0540 by Geri Milton, RN  Outcome: Progressing  6/30/2025 0539 by Karine, Geri M, RN  Outcome: Progressing  Goal: Absence of cardiac dysrhythmias or at 
  Problem: Chronic Conditions and Co-morbidities  Goal: Patient's chronic conditions and co-morbidity symptoms are monitored and maintained or improved  6/30/2025 1217 by Raquel Davis RN  Outcome: Adequate for Discharge  Flowsheets (Taken 6/30/2025 0815)  Care Plan - Patient's Chronic Conditions and Co-Morbidity Symptoms are Monitored and Maintained or Improved:   Monitor and assess patient's chronic conditions and comorbid symptoms for stability, deterioration, or improvement   Collaborate with multidisciplinary team to address chronic and comorbid conditions and prevent exacerbation or deterioration   Update acute care plan with appropriate goals if chronic or comorbid symptoms are exacerbated and prevent overall improvement and discharge     Problem: Discharge Planning  Goal: Discharge to home or other facility with appropriate resources  6/30/2025 1217 by Raquel Davis RN  Outcome: Adequate for Discharge  Flowsheets (Taken 6/30/2025 0815)  Discharge to home or other facility with appropriate resources:   Identify barriers to discharge with patient and caregiver   Arrange for needed discharge resources and transportation as appropriate     Problem: Pain  Goal: Verbalizes/displays adequate comfort level or baseline comfort level  6/30/2025 1217 by Raquel Davis RN  Outcome: Adequate for Discharge  Flowsheets (Taken 6/29/2025 1041 by Karol Gomez, RN)  Verbalizes/displays adequate comfort level or baseline comfort level: Encourage patient to monitor pain and request assistance     Problem: ABCDS Injury Assessment  Goal: Absence of physical injury  6/30/2025 1217 by Raquel Davis RN  Outcome: Adequate for Discharge  Flowsheets (Taken 6/27/2025 1830)  Absence of Physical Injury: Implement safety measures based on patient assessment     Problem: Safety - Adult  Goal: Free from fall injury  6/30/2025 1217 by Raquel Davis RN  Outcome: Adequate for Discharge  Flowsheets (Taken 6/29/2025 1049 by 
  Problem: Chronic Conditions and Co-morbidities  Goal: Patient's chronic conditions and co-morbidity symptoms are monitored and maintained or improved  Outcome: Progressing     Problem: Discharge Planning  Goal: Discharge to home or other facility with appropriate resources  6/30/2025 0540 by Geri Milton RN  Outcome: Progressing  6/30/2025 0539 by Geri Milton RN  Outcome: Progressing     Problem: Pain  Goal: Verbalizes/displays adequate comfort level or baseline comfort level  6/30/2025 0540 by Geri Milton RN  Outcome: Progressing  6/30/2025 0539 by Geri Milton RN  Outcome: Progressing     Problem: ABCDS Injury Assessment  Goal: Absence of physical injury  6/30/2025 0540 by Geri Milton RN  Outcome: Progressing  6/30/2025 0539 by Geri Milton RN  Outcome: Progressing     Problem: Safety - Adult  Goal: Free from fall injury  6/30/2025 0540 by Geri Milton RN  Outcome: Progressing  6/30/2025 0539 by Geri Milton RN  Outcome: Progressing     Problem: Respiratory - Adult  Goal: Achieves optimal ventilation and oxygenation  6/30/2025 0540 by Geri Milton RN  Outcome: Progressing  6/30/2025 0539 by Geri Milton RN  Outcome: Progressing     Problem: Cardiovascular - Adult  Goal: Maintains optimal cardiac output and hemodynamic stability  6/30/2025 0540 by Geri Milton RN  Outcome: Progressing  6/30/2025 0539 by Geri Milton RN  Outcome: Progressing  Goal: Absence of cardiac dysrhythmias or at baseline  6/30/2025 0540 by Geri Milton RN  Outcome: Progressing  6/30/2025 0539 by Geri Milton RN  Outcome: Progressing     Problem: Chronic Conditions and Co-morbidities  Goal: Patient's chronic conditions and co-morbidity symptoms are monitored and maintained or improved  Outcome: Progressing     Problem: Discharge Planning  Goal: Discharge to home or other facility with appropriate resources  6/30/2025 0540 by Geri Milton RN  Outcome: 
  Problem: Chronic Conditions and Co-morbidities  Goal: Patient's chronic conditions and co-morbidity symptoms are monitored and maintained or improved  Outcome: Progressing     Problem: Discharge Planning  Goal: Discharge to home or other facility with appropriate resources  Outcome: Progressing     Problem: Pain  Goal: Verbalizes/displays adequate comfort level or baseline comfort level  Outcome: Progressing     Problem: ABCDS Injury Assessment  Goal: Absence of physical injury  Outcome: Progressing     Problem: Safety - Adult  Goal: Free from fall injury  Outcome: Progressing     Problem: Respiratory - Adult  Goal: Achieves optimal ventilation and oxygenation  Outcome: Progressing     Problem: Cardiovascular - Adult  Goal: Maintains optimal cardiac output and hemodynamic stability  Outcome: Progressing  Goal: Absence of cardiac dysrhythmias or at baseline  Outcome: Progressing     
  Problem: Discharge Planning  Goal: Discharge to home or other facility with appropriate resources  Outcome: Progressing     Problem: Pain  Goal: Verbalizes/displays adequate comfort level or baseline comfort level  Outcome: Progressing     Problem: ABCDS Injury Assessment  Goal: Absence of physical injury  Outcome: Progressing     Problem: Safety - Adult  Goal: Free from fall injury  Outcome: Progressing     Problem: Respiratory - Adult  Goal: Achieves optimal ventilation and oxygenation  Outcome: Progressing     Problem: Cardiovascular - Adult  Goal: Maintains optimal cardiac output and hemodynamic stability  Outcome: Progressing  Goal: Absence of cardiac dysrhythmias or at baseline  Outcome: Progressing     
  Problem: Discharge Planning  Goal: Discharge to home or other facility with appropriate resources  Outcome: Progressing  Flowsheets (Taken 6/27/2025 1726)  Discharge to home or other facility with appropriate resources:   Identify barriers to discharge with patient and caregiver   Arrange for needed discharge resources and transportation as appropriate     Problem: ABCDS Injury Assessment  Goal: Absence of physical injury  Outcome: Progressing  Flowsheets (Taken 6/27/2025 1830)  Absence of Physical Injury: Implement safety measures based on patient assessment     Problem: Respiratory - Adult  Goal: Achieves optimal ventilation and oxygenation  Outcome: Progressing  Flowsheets (Taken 6/27/2025 1830)  Achieves optimal ventilation and oxygenation:   Assess for changes in respiratory status   Assess for changes in mentation and behavior     Problem: Cardiovascular - Adult  Goal: Maintains optimal cardiac output and hemodynamic stability  Outcome: Progressing  Flowsheets (Taken 6/27/2025 1830)  Maintains optimal cardiac output and hemodynamic stability:   Monitor blood pressure and heart rate   Assess for signs of decreased cardiac output   Monitor urine output and notify Licensed Independent Practitioner for values outside of normal range   Administer vasoactive medications as ordered     Problem: Cardiovascular - Adult  Goal: Absence of cardiac dysrhythmias or at baseline  Outcome: Progressing  Flowsheets (Taken 6/27/2025 1830)  Absence of cardiac dysrhythmias or at baseline:   Monitor cardiac rate and rhythm   Assess for signs of decreased cardiac output     
optimal ventilation and oxygenation  6/29/2025 1041 by Karol Gomez RN  Outcome: Progressing  Flowsheets (Taken 6/28/2025 0748)  Achieves optimal ventilation and oxygenation: Assess for changes in respiratory status  6/29/2025 0332 by Veronica Pope RN  Outcome: Progressing     Problem: Cardiovascular - Adult  Goal: Maintains optimal cardiac output and hemodynamic stability  6/29/2025 1041 by Karol Gomez RN  Outcome: Progressing  Flowsheets (Taken 6/28/2025 0748)  Maintains optimal cardiac output and hemodynamic stability: Monitor blood pressure and heart rate  6/29/2025 0332 by Veronica Pope RN  Outcome: Progressing  Goal: Absence of cardiac dysrhythmias or at baseline  6/29/2025 1041 by Karol Gomez RN  Outcome: Progressing  Flowsheets (Taken 6/28/2025 0748)  Absence of cardiac dysrhythmias or at baseline: Monitor cardiac rate and rhythm  6/29/2025 0332 by Veronica Pope RN  Outcome: Progressing

## 2025-06-30 NOTE — PROGRESS NOTES
Discharge instructions and medications discussed and reviewed with pt. Pt voiced understanding. IV removed

## 2025-06-30 NOTE — PROGRESS NOTES
[facility]  -  Progress Note      Patient: Donavan Hidalgo MRN: [id number]  SSN: [id number]    YOB: 1960  Age: 65 y.o.  Sex: male      Admit Date: 6/27/2025    Assessment:     - Abnormal nuclear stress test on 6/26/25  EF 23%, no evidence of inducible ischemia. Medium to large perfusion defect in the apical septal segments.   - CAD with CABG x2 LIMA to LAD, SVG diagonal branch 9/2019  - Chronic HFrEF/Ischemic cardiomyopathy. Does not appear volume overloaded. CXR without evidence of CHF, nt pro bnp 573. On oral lasix 40mg prn as outpatient.  Stress test 6/25 with EF 23%  Echo 3/8/25 EF 50-55%, apical septa and apex hypoK. Grade I DD.   GDMT with coreg, aldactone, lisinopril  - Aortic stenosis s/p AVR 9/2019  - pAF with JOVANI 9/2019. OAC with Warfarin. Goal INR 2-3. Rate control with coreg 12.5mg BID  - Sick sinus syndrome. PPM initially implanted in 2011. Patient underwent system extraction with D-PPM reimplantation 6/2020.  - HTN  - Dyslipidemia. Intolerant to statins.  On Repatha  - TOMEKA, not on CPAP  - GERD  - Hx of melanoma     Primary cardiologist: Dr. Gardner    Plan:     Clinically, he remains stable without unstable angina or symptoms consistent with CHF.  His blood pressure is quite well-controlled on current medication regimen.  Unfortunately, his INR remains at 2.0 despite the fact that we held his Coumadin Saturday and Sunday; his INR Saturday morning was 2.0.    Since he is not having any unstable symptoms, would not proceed with coronary angiography with INR 2.0 using groin approach.  I feel that he needs to continue to hold his Coumadin with target INR less than 1.5 prior to having his coronary angiography.  He should be able to go to his friend's house in town on Lovenox, off of Coumadin.  He will continue on baby aspirin daily.  He will have his INR checked at our office on Wednesday.  If his INR is less than 1.5 on Wednesday, he will come in for elective coronary

## 2025-06-30 NOTE — DISCHARGE INSTRUCTIONS
DISCHARGE SUMMARY from Nurse    PATIENT INSTRUCTIONS:    After general anesthesia or intravenous sedation, for 24 hours or while taking prescription Narcotics:  Limit your activities  Do not drive and operate hazardous machinery  Do not make important personal or business decisions  Do  not drink alcoholic beverages  If you have not urinated within 8 hours after discharge, please contact your surgeon on call.    Report the following to your surgeon:  Excessive pain, swelling, redness or odor of or around the surgical area  Temperature over 100.5  Nausea and vomiting lasting longer than 4 hours or if unable to take medications  Any signs of decreased circulation or nerve impairment to extremity: change in color, persistent  numbness, tingling, coldness or increase pain  Any questions    What to do at Home:  Recommended activity: activity as tolerated,     If you experience any of the following symptoms shortness of breath and fatigue, please follow up with primary care provider or go to nearest emergency room.    *  Please give a list of your current medications to your Primary Care Provider.    *  Please update this list whenever your medications are discontinued, doses are      changed, or new medications (including over-the-counter products) are added.    *  Please carry medication information at all times in case of emergency situations.    These are general instructions for a healthy lifestyle:    No smoking/ No tobacco products/ Avoid exposure to second hand smoke  Surgeon General's Warning:  Quitting smoking now greatly reduces serious risk to your health.    Obesity, smoking, and sedentary lifestyle greatly increases your risk for illness    A healthy diet, regular physical exercise & weight monitoring are important for maintaining a healthy lifestyle    You may be retaining fluid if you have a history of heart failure or if you experience any of the following symptoms:  Weight gain of 3 pounds or more

## 2025-06-30 NOTE — PROGRESS NOTES
TRANSFER - OUT REPORT:    Verbal report given to CRISTHIAN García on Donavan Hidalgo  being transferred to [facility] for routine progression of patient care       Report consisted of patient's Situation, Background, Assessment and   Recommendations(SBAR).     Information from the following report(s) Nurse Handoff Report was reviewed with the receiving nurse.           Lines:   Peripheral IV 06/27/25 Left;Anterior Forearm (Active)   Site Assessment Clean, dry & intact 06/30/25 0915   Line Status Flushed 06/30/25 0915   Line Care Connections checked and tightened 06/30/25 0915   Phlebitis Assessment No symptoms 06/30/25 0915   Infiltration Assessment 1 06/30/25 0915   Alcohol Cap Used Yes 06/30/25 0915   Dressing Status Clean, dry & intact 06/30/25 0915   Dressing Type Transparent 06/30/25 0915   Dressing Intervention New 06/27/25 1842       Peripheral IV 06/30/25 Right Antecubital (Active)   Site Assessment Clean, dry & intact 06/30/25 0915   Line Status Blood return noted;Flushed 06/30/25 0915   Line Care Connections checked and tightened 06/30/25 0915   Phlebitis Assessment No symptoms 06/30/25 0915   Infiltration Assessment 1 06/30/25 0915   Alcohol Cap Used Yes 06/30/25 0915   Dressing Status Clean, dry & intact 06/30/25 0915   Dressing Type Transparent 06/30/25 0915        Opportunity for questions and clarification was provided.      Patient transported with:  Tech: Transport.

## 2025-07-02 ENCOUNTER — ANTI-COAG VISIT (OUTPATIENT)
Age: 65
End: 2025-07-02

## 2025-07-02 DIAGNOSIS — Z79.01 LONG TERM (CURRENT) USE OF ANTICOAGULANTS: ICD-10-CM

## 2025-07-02 DIAGNOSIS — Z95.2 HISTORY OF AORTIC VALVE REPLACEMENT: Primary | ICD-10-CM

## 2025-07-02 PROBLEM — R73.03 PREDIABETES: Status: ACTIVE | Noted: 2025-07-02

## 2025-07-02 LAB — INR BLD: 1.4

## 2025-07-02 NOTE — PROGRESS NOTES
Verbal order and read back per Karin Mosqueda, APRN - NP    The INR is below the therapeutic range due to stopping Coumadin for LHC but got canceled due to insurance issues.  Please make the following adjustments to Coumadin dosing:    Please take 10mg for the next 2 days then continue Coumadin 5 mg daily except 3 mg on Tues-Thurs-Sun and take Lovenox 2 times a day for the next 2 days. Recheck in 1 week.    Shelia Cleveland RN   Clinical Coordinator   [facility] at [facility]

## 2025-07-02 NOTE — DISCHARGE SUMMARY
Hospitalist Discharge Summary      Patient: Donavan Hidalgo MRN: [id number]  CSN: [id number]    YOB: 1960  Age: 65 y.o.  Sex: male    DOA: 6/27/2025 LOS:  LOS: 3 days   Discharge Date:6/30/2025     Admission Diagnoses: CHF (congestive heart failure) (HCC) [I50.9]  Acute on chronic systolic CHF (congestive heart failure) (HCC) [I50.23]    Discharge Diagnoses:    Acute on chronic heart failure with reduced EF  Essential hypertension  History of aortic valve replacement  Hyperlipidemia  Prediabetes    Discharge Condition: Fair    Discharge To: Home    CODE STATUS: Prior         PHYSICAL EXAM  Visit Vitals  BP (!) 142/96   Pulse 61   Temp 97.9 °F (36.6 °C) (Oral)   Resp 18   Ht 1.905 m (6' 3\")   Wt 104.8 kg (231 lb)   SpO2 96%   BMI 28.87 kg/m²     General: No acute distress  CV: S1, S2 auscultated; grade 2/6 systolic murmur auscultated at the sternal border  Respiratory: Lungs are clear to auscultation  Skin: Warm and dry; median sternotomy scar present                                HPI per admitting physician:    Donavan Hidalgo is a 65 y.o. male who presents as a Direct Admission to [facility] Telemetry Unit for management of Acute on Chronic Systolic Congestive Heart Failure (a.k.a. CHF) and later Left Heart Catheterization.  Chart Review shows that Nuclear Stress Test performed on 6/26/2025 resulted in High Risk with LVEF 23% and there was a perfusion defect noted.  Patient endorses the information above and reports that his previous Left Ventricular Ejection Fraction (a.k.a. LVEF) was >50% in March 2025.  Patient reports that he has been having shortness of breath more so than dyspnea on exertion and that this typically occurs at rest.  Patient endorses orthopnea, weight gain, and bilateral lower extremity edema over approximately the last week.  Patient also reports that he is having intermittent chest pain that is sternal in its location, brief in its duration, feels

## 2025-07-08 ENCOUNTER — TELEPHONE (OUTPATIENT)
Age: 65
End: 2025-07-08

## 2025-07-08 DIAGNOSIS — R94.39 ABNORMAL NUCLEAR STRESS TEST: Primary | ICD-10-CM

## 2025-07-08 RX ORDER — ENOXAPARIN SODIUM 100 MG/ML
1 INJECTION SUBCUTANEOUS 2 TIMES DAILY
COMMUNITY
End: 2025-07-08

## 2025-07-08 RX ORDER — ENOXAPARIN SODIUM 100 MG/ML
1 INJECTION SUBCUTANEOUS 2 TIMES DAILY
COMMUNITY
End: 2025-07-08 | Stop reason: SDUPTHER

## 2025-07-08 NOTE — TELEPHONE ENCOUNTER
Catheterization Instructions      You are scheduled to have a left heart cath on 7/17/25.  [facility] Cath holding will call you 72 hours prior to procedure to state your check in time.      Please go to [facility] and park in the outpatient parking lot that is located around to the back of the hospital and enter through the [facility] building.  Once you enter through the [facility] check in with the  there. The  will either give you directions or assist you in getting to the cath holding area. ([address] [address])      You are not to eat or drink anything after midnight the night before your procedure. Small sips of water to take your medications is ok.     If you are diabetic, do not take your insulin/sugar pill the morning of the procedure.    MEDICATION INSTRUCTIONS:   Please take your morning medications with the following special instructions:    Please make sure to take your Blood pressure medication : Amlodipine, Lisinopril, carvedilol,.    Take your Aspirin.     Stop your Glucophage (Metformin) 48 hrs prior to procedure and do not resume until after you have the blood work done.    Stop your Coumadin five days on 7/12/25 prior to procedure and do not resume it until after the procedure. Start Lovenox on 7/14/25 through 7/16/25. Do not take Lovenox on 7/17/25 but resume 7/17/25 and 7/18/25. Resume Coumadin on 7/17/25 take 10mg on 7/17 and 7/18 then resume normal schedule 3mg Sun, Tues, Thurs and 5mg all other days and recheck INR on 7/25/25   Please get labs drawn 1 week prior to cath.     We encourage families to wait in the waiting room on the first floor while the procedure is being done.  The Doctor will come out and talk with you as soon as the procedure is over. You will need someone to drive you home after you have been discharged from the hospital, no Uber, Bus, cab or Lift acceptable.      If you or

## 2025-07-09 ENCOUNTER — ANTI-COAG VISIT (OUTPATIENT)
Age: 65
End: 2025-07-09

## 2025-07-09 DIAGNOSIS — Z79.01 LONG TERM (CURRENT) USE OF ANTICOAGULANTS: ICD-10-CM

## 2025-07-09 DIAGNOSIS — Z95.2 HISTORY OF AORTIC VALVE REPLACEMENT: Primary | ICD-10-CM

## 2025-07-09 LAB — INR BLD: 2.4

## 2025-07-09 NOTE — PROGRESS NOTES
Verbal order and read back per BILL Bustillo NP    The INR is below the therapeutic range.  Please make the following adjustments to Coumadin dosing:     Take Coumadin 5mg today, 3mg Thursday, and 5mg Friday.   Stop your Coumadin on 7/12/25  5 days prior to procedure and do not resume it until after the procedure. Start Lovenox on 7/14/25 through 7/16/25. Do not take Lovenox on 7/17/25 but resume 7/17/25 and 7/18/25. Resume Coumadin on 7/17/25 take 10mg on 7/17 and 7/18 then resume normal schedule 3mg Sun, Tues, Thurs and 5mg all other days and recheck INR on 7/25/25.     Shelia Cleveland RN   Clinical Coordinator   [facility] at [facility]

## 2025-07-10 RX ORDER — ENOXAPARIN SODIUM 100 MG/ML
1 INJECTION SUBCUTANEOUS 2 TIMES DAILY
Qty: 10 ML | Refills: 0 | Status: SHIPPED | OUTPATIENT
Start: 2025-07-10

## 2025-07-10 NOTE — PROGRESS NOTES
Verified arrival time of 0815 for 0915 procedure on 7-17-25. Pre-procedure instructions verified including NPO after midnight and which meds to take and/or hold. All questions answered, patient verbalized understanding.

## 2025-07-14 PROBLEM — R94.39 POSITIVE CARDIAC STRESS TEST: Status: ACTIVE | Noted: 2025-07-14

## 2025-07-16 LAB — INR BLD: 2.3

## 2025-07-17 ENCOUNTER — ANTI-COAG VISIT (OUTPATIENT)
Age: 65
End: 2025-07-17

## 2025-07-17 ENCOUNTER — HOSPITAL ENCOUNTER (OUTPATIENT)
Facility: HOSPITAL | Age: 65
Setting detail: OBSERVATION
Discharge: HOME | End: 2025-07-18
Attending: INTERNAL MEDICINE | Admitting: INTERNAL MEDICINE
Payer: COMMERCIAL

## 2025-07-17 DIAGNOSIS — Z79.01 LONG TERM (CURRENT) USE OF ANTICOAGULANTS: ICD-10-CM

## 2025-07-17 DIAGNOSIS — R94.39 ABNORMAL NUCLEAR STRESS TEST: ICD-10-CM

## 2025-07-17 DIAGNOSIS — Z95.2 HISTORY OF AORTIC VALVE REPLACEMENT: Primary | ICD-10-CM

## 2025-07-17 DIAGNOSIS — Z95.5 STENTED CORONARY ARTERY: Primary | ICD-10-CM

## 2025-07-17 PROBLEM — I25.10 CAD IN NATIVE ARTERY: Status: ACTIVE | Noted: 2025-07-17

## 2025-07-17 PROBLEM — I25.10 CORONARY ARTERY DISEASE: Status: ACTIVE | Noted: 2025-07-17

## 2025-07-17 LAB
ECHO BSA: 2.35 M2
EKG ATRIAL RATE: 61 BPM
EKG DIAGNOSIS: NORMAL
EKG P AXIS: 56 DEGREES
EKG P-R INTERVAL: 274 MS
EKG Q-T INTERVAL: 414 MS
EKG QRS DURATION: 110 MS
EKG QTC CALCULATION (BAZETT): 416 MS
EKG R AXIS: -21 DEGREES
EKG T AXIS: 101 DEGREES
EKG VENTRICULAR RATE: 61 BPM
GLUCOSE BLD STRIP.AUTO-MCNC: 120 MG/DL (ref 70–110)
GLUCOSE BLD STRIP.AUTO-MCNC: 126 MG/DL (ref 70–110)
GLUCOSE BLD STRIP.AUTO-MCNC: 192 MG/DL (ref 70–110)
INR PPP: 1.6 (ref 0.9–1.2)
PROTHROMBIN TIME: 19.2 SEC (ref 12–15.1)

## 2025-07-17 PROCEDURE — 93458 L HRT ARTERY/VENTRICLE ANGIO: CPT | Performed by: INTERNAL MEDICINE

## 2025-07-17 PROCEDURE — 76000 FLUOROSCOPY <1 HR PHYS/QHP: CPT | Performed by: INTERNAL MEDICINE

## 2025-07-17 PROCEDURE — G0378 HOSPITAL OBSERVATION PER HR: HCPCS

## 2025-07-17 PROCEDURE — 92928 PRQ TCAT PLMT NTRAC ST 1 LES: CPT | Performed by: INTERNAL MEDICINE

## 2025-07-17 PROCEDURE — 2709999900 HC NON-CHARGEABLE SUPPLY: Performed by: INTERNAL MEDICINE

## 2025-07-17 PROCEDURE — 6370000000 HC RX 637 (ALT 250 FOR IP)

## 2025-07-17 PROCEDURE — C1725 CATH, TRANSLUMIN NON-LASER: HCPCS | Performed by: INTERNAL MEDICINE

## 2025-07-17 PROCEDURE — 93010 ELECTROCARDIOGRAM REPORT: CPT | Performed by: INTERNAL MEDICINE

## 2025-07-17 PROCEDURE — 92929 PR PRQ TRLUML CORONARY STENT W/ANGIO ADDL ART/BRNCH: CPT | Performed by: INTERNAL MEDICINE

## 2025-07-17 PROCEDURE — 99152 MOD SED SAME PHYS/QHP 5/>YRS: CPT | Performed by: INTERNAL MEDICINE

## 2025-07-17 PROCEDURE — C1887 CATHETER, GUIDING: HCPCS | Performed by: INTERNAL MEDICINE

## 2025-07-17 PROCEDURE — 85610 PROTHROMBIN TIME: CPT

## 2025-07-17 PROCEDURE — C9600 PERC DRUG-EL COR STENT SING: HCPCS | Performed by: INTERNAL MEDICINE

## 2025-07-17 PROCEDURE — 82962 GLUCOSE BLOOD TEST: CPT

## 2025-07-17 PROCEDURE — C1874 STENT, COATED/COV W/DEL SYS: HCPCS | Performed by: INTERNAL MEDICINE

## 2025-07-17 PROCEDURE — C1760 CLOSURE DEV, VASC: HCPCS | Performed by: INTERNAL MEDICINE

## 2025-07-17 PROCEDURE — 6360000002 HC RX W HCPCS: Performed by: INTERNAL MEDICINE

## 2025-07-17 PROCEDURE — C1894 INTRO/SHEATH, NON-LASER: HCPCS | Performed by: INTERNAL MEDICINE

## 2025-07-17 PROCEDURE — C1876 STENT, NON-COA/NON-COV W/DEL: HCPCS | Performed by: INTERNAL MEDICINE

## 2025-07-17 PROCEDURE — 93005 ELECTROCARDIOGRAM TRACING: CPT

## 2025-07-17 PROCEDURE — C1713 ANCHOR/SCREW BN/BN,TIS/BN: HCPCS | Performed by: INTERNAL MEDICINE

## 2025-07-17 PROCEDURE — 7100000010 HC PHASE II RECOVERY - FIRST 15 MIN: Performed by: INTERNAL MEDICINE

## 2025-07-17 PROCEDURE — 93005 ELECTROCARDIOGRAM TRACING: CPT | Performed by: INTERNAL MEDICINE

## 2025-07-17 PROCEDURE — 6370000000 HC RX 637 (ALT 250 FOR IP): Performed by: INTERNAL MEDICINE

## 2025-07-17 PROCEDURE — 2580000003 HC RX 258: Performed by: INTERNAL MEDICINE

## 2025-07-17 PROCEDURE — 6360000004 HC RX CONTRAST MEDICATION: Performed by: INTERNAL MEDICINE

## 2025-07-17 PROCEDURE — 94761 N-INVAS EAR/PLS OXIMETRY MLT: CPT

## 2025-07-17 DEVICE — XIENCE SKYPOINT™ EVEROLIMUS ELUTING CORONARY STENT SYSTEM 3.00 MM X 23 MM / RAPID-EXCHANGE
Type: IMPLANTABLE DEVICE | Site: CORONARY - LEFT CIRCUMFLEX | Status: FUNCTIONAL
Brand: XIENCE SKYPOINT™

## 2025-07-17 DEVICE — ANGIO-SEAL VIP VASCULAR CLOSURE DEVICE
Type: IMPLANTABLE DEVICE | Site: GROIN | Status: FUNCTIONAL
Brand: ANGIO-SEAL

## 2025-07-17 RX ORDER — SODIUM CHLORIDE 9 MG/ML
INJECTION, SOLUTION INTRAVENOUS PRN
Status: DISCONTINUED | OUTPATIENT
Start: 2025-07-17 | End: 2025-07-18 | Stop reason: HOSPADM

## 2025-07-17 RX ORDER — FENTANYL CITRATE 50 UG/ML
INJECTION, SOLUTION INTRAMUSCULAR; INTRAVENOUS PRN
Status: DISCONTINUED | OUTPATIENT
Start: 2025-07-17 | End: 2025-07-17 | Stop reason: HOSPADM

## 2025-07-17 RX ORDER — CLOPIDOGREL 300 MG/1
TABLET, FILM COATED ORAL PRN
Status: DISCONTINUED | OUTPATIENT
Start: 2025-07-17 | End: 2025-07-17 | Stop reason: HOSPADM

## 2025-07-17 RX ORDER — MIDAZOLAM HYDROCHLORIDE 1 MG/ML
INJECTION, SOLUTION INTRAMUSCULAR; INTRAVENOUS PRN
Status: DISCONTINUED | OUTPATIENT
Start: 2025-07-17 | End: 2025-07-17 | Stop reason: HOSPADM

## 2025-07-17 RX ORDER — CARVEDILOL 25 MG/1
25 TABLET ORAL 2 TIMES DAILY
Status: DISCONTINUED | OUTPATIENT
Start: 2025-07-17 | End: 2025-07-18 | Stop reason: HOSPADM

## 2025-07-17 RX ORDER — IODIXANOL 320 MG/ML
INJECTION, SOLUTION INTRAVASCULAR PRN
Status: DISCONTINUED | OUTPATIENT
Start: 2025-07-17 | End: 2025-07-17 | Stop reason: HOSPADM

## 2025-07-17 RX ORDER — ASPIRIN 81 MG/1
81 TABLET, CHEWABLE ORAL ONCE
Status: DISCONTINUED | OUTPATIENT
Start: 2025-07-17 | End: 2025-07-18 | Stop reason: HOSPADM

## 2025-07-17 RX ORDER — INSULIN LISPRO 100 [IU]/ML
0-4 INJECTION, SOLUTION INTRAVENOUS; SUBCUTANEOUS
Status: DISCONTINUED | OUTPATIENT
Start: 2025-07-17 | End: 2025-07-18 | Stop reason: HOSPADM

## 2025-07-17 RX ORDER — DEXTROSE MONOHYDRATE 100 MG/ML
INJECTION, SOLUTION INTRAVENOUS CONTINUOUS PRN
Status: DISCONTINUED | OUTPATIENT
Start: 2025-07-17 | End: 2025-07-18 | Stop reason: HOSPADM

## 2025-07-17 RX ORDER — AMLODIPINE BESYLATE 5 MG/1
5 TABLET ORAL DAILY
Status: DISCONTINUED | OUTPATIENT
Start: 2025-07-18 | End: 2025-07-18 | Stop reason: HOSPADM

## 2025-07-17 RX ORDER — ASPIRIN 81 MG/1
81 TABLET, CHEWABLE ORAL DAILY
Status: DISCONTINUED | OUTPATIENT
Start: 2025-07-18 | End: 2025-07-18 | Stop reason: HOSPADM

## 2025-07-17 RX ORDER — HEPARIN SODIUM 1000 [USP'U]/ML
INJECTION, SOLUTION INTRAVENOUS; SUBCUTANEOUS PRN
Status: DISCONTINUED | OUTPATIENT
Start: 2025-07-17 | End: 2025-07-17 | Stop reason: HOSPADM

## 2025-07-17 RX ORDER — LISINOPRIL 20 MG/1
20 TABLET ORAL DAILY
Status: DISCONTINUED | OUTPATIENT
Start: 2025-07-18 | End: 2025-07-18 | Stop reason: HOSPADM

## 2025-07-17 RX ORDER — SPIRONOLACTONE 25 MG/1
25 TABLET ORAL DAILY
Status: DISCONTINUED | OUTPATIENT
Start: 2025-07-18 | End: 2025-07-18 | Stop reason: HOSPADM

## 2025-07-17 RX ORDER — LANOLIN ALCOHOL/MO/W.PET/CERES
400 CREAM (GRAM) TOPICAL DAILY
Status: DISCONTINUED | OUTPATIENT
Start: 2025-07-17 | End: 2025-07-18 | Stop reason: HOSPADM

## 2025-07-17 RX ORDER — FUROSEMIDE 20 MG/1
20 TABLET ORAL DAILY
Status: DISCONTINUED | OUTPATIENT
Start: 2025-07-18 | End: 2025-07-18 | Stop reason: HOSPADM

## 2025-07-17 RX ORDER — BIVALIRUDIN 250 MG/5ML
INJECTION, POWDER, LYOPHILIZED, FOR SOLUTION INTRAVENOUS PRN
Status: DISCONTINUED | OUTPATIENT
Start: 2025-07-17 | End: 2025-07-17 | Stop reason: HOSPADM

## 2025-07-17 RX ORDER — TRAZODONE HYDROCHLORIDE 50 MG/1
50 TABLET ORAL NIGHTLY PRN
Status: DISCONTINUED | OUTPATIENT
Start: 2025-07-17 | End: 2025-07-18 | Stop reason: HOSPADM

## 2025-07-17 RX ORDER — CLOPIDOGREL BISULFATE 75 MG/1
75 TABLET ORAL DAILY
Status: DISCONTINUED | OUTPATIENT
Start: 2025-07-18 | End: 2025-07-18 | Stop reason: HOSPADM

## 2025-07-17 RX ADMIN — INSULIN LISPRO 1 UNITS: 100 INJECTION, SOLUTION INTRAVENOUS; SUBCUTANEOUS at 12:27

## 2025-07-17 RX ADMIN — CARVEDILOL 25 MG: 25 TABLET, FILM COATED ORAL at 20:18

## 2025-07-17 RX ADMIN — Medication 400 MG: at 12:27

## 2025-07-17 NOTE — PLAN OF CARE
Problem: Chronic Conditions and Co-morbidities  Goal: Patient's chronic conditions and co-morbidity symptoms are monitored and maintained or improved  Outcome: Progressing  Flowsheets (Taken 7/17/2025 1241)  Care Plan - Patient's Chronic Conditions and Co-Morbidity Symptoms are Monitored and Maintained or Improved:   Monitor and assess patient's chronic conditions and comorbid symptoms for stability, deterioration, or improvement   Collaborate with multidisciplinary team to address chronic and comorbid conditions and prevent exacerbation or deterioration   Update acute care plan with appropriate goals if chronic or comorbid symptoms are exacerbated and prevent overall improvement and discharge     Problem: Cardiovascular - Adult  Goal: Maintains optimal cardiac output and hemodynamic stability  Outcome: Progressing  Flowsheets (Taken 7/17/2025 1241)  Maintains optimal cardiac output and hemodynamic stability:   Monitor blood pressure and heart rate   Monitor urine output and notify Licensed Independent Practitioner for values outside of normal range   Assess for signs of decreased cardiac output     Problem: Skin/Tissue Integrity - Adult  Goal: Skin integrity remains intact  Outcome: Progressing  Flowsheets (Taken 7/17/2025 1241)  Skin Integrity Remains Intact:   Monitor for areas of redness and/or skin breakdown   Assess vascular access sites hourly

## 2025-07-17 NOTE — H&P
[facility]  -        Patient: Donavan Hidalgo MRN: [id number]  SSN: [id number]    YOB: 1960  Age: 65 y.o.  Sex: male       Admit Date: 6/27/2025     Assessment:      - Abnormal nuclear stress test on 6/26/25  EF 23%, no evidence of inducible ischemia. Medium to large perfusion defect in the apical septal segments.   - CAD with CABG x2 LIMA to LAD, SVG diagonal branch 9/2019  - Chronic HFrEF/Ischemic cardiomyopathy. Does not appear volume overloaded. CXR without evidence of CHF, nt pro bnp 573. On oral lasix 40mg prn as outpatient.  Stress test 6/25 with EF 23%  Echo 3/8/25 EF 50-55%, apical septa and apex hypoK. Grade I DD.   GDMT with coreg, aldactone, lisinopril  - Aortic stenosis s/p AVR 9/2019  - pAF with JOVANI 9/2019. OAC with Warfarin. Goal INR 2-3. Rate control with coreg 12.5mg BID  - Sick sinus syndrome. PPM initially implanted in 2011. Patient underwent system extraction with D-PPM reimplantation 6/2020.  - HTN  - Dyslipidemia. Intolerant to statins.  On Repatha  - TOMEKA, not on CPAP  - GERD  - Hx of melanoma     Primary cardiologist: Dr. Gardner     Plan:      He has no symptoms of chest pains or shortness of breath.  His echocardiogram yesterday showed EF 45% with distal anterior regional wall motion abnormality.  Agree with proceeding on with coronary angiography.  He has no evidence of unstable angina currently.     The plan is to proceed with coronary angiography, using right groin approach, when his INR is below 1.5.  If his INR tomorrow morning is decreased, we will proceed with coronary angiography.  If the INR is above comfort level for groin approach, consideration can be given for patient to go to their local friend's house and get his INR checked on a daily basis until his INR is less than 1.4; he will need to be on Lovenox while his INR decreases.     This was discussed at length with the patient and his wife.  They are in agreement.  For his increased

## 2025-07-17 NOTE — PROGRESS NOTES
Cath holding summary:    0700: Patient ambulated from waiting area without difficulty, placed on monitor nsr. A&O x4, no c/o pain. NPO since midnight, ID and allergies verified. H&P reviewed, med rec completed. PIV x2 inserted, blood sent to lab. Groin prep completed, consent ready for signature.    0907: Verbal report given to ALICIA Colmenares on Donavan Hidalgo being transferred to cath lab for ordered procedure. Report consisted of patient's Situation, Background, Assessment and Recommendations (SBAR). Information from the following report(s) Nurse Handoff Report, Adult Overview, and MAR was reviewed with the receiving nurse. Opportunity for questions and clarification was provided.    1020: Verbal report received from CRISTHIAN Stone on Donavan Hidalgo being received from cath lab for routine post-op. Report consisted of patient's Situation, Background, Assessment and Recommendations (SBAR). Information from the following report(s) Adult Overview, Surgery Report, and MAR was reviewed with the receiving nurse. Pt A&O x4, no c/o pain. Opportunity for questions and clarification provided.  Procedure: Cardiac Cath  Intervention: Yes  If yes, antiplatelet administered: heparin and angiomax  Site: Right, Groin  Pain:     1133: Verbal report given to Sergio MORROW on Donavan Hidalgo  being transferred to [facility] for routine progression of patient care. Report consisted of patient's Situation, Background, Assessment and Recommendations (SBAR). Information from the following report(s) Nurse Handoff Report, Adult Overview, Surgery Report, and MAR was reviewed with the receiving nurse. Opportunity for questions and clarification was provided.     1145- Patient transported with: transportation tech

## 2025-07-17 NOTE — PROGRESS NOTES
Verbal order and read back per BILL Bustillo NP    Take Coumadin 5mg today, 3mg Thursday, and 5mg Friday.   Stop your Coumadin on 7/12/25  5 days prior to procedure and do not resume it until after the procedure. Start Lovenox on 7/14/25 through 7/16/25. Do not take Lovenox on 7/17/25 but resume 7/17/25 and 7/18/25. Resume Coumadin on 7/17/25 take 10mg on 7/17 and 7/18 then resume normal schedule 3mg Sun, Tues, Thurs and 5mg all other days and recheck INR on 7/25/25.

## 2025-07-18 VITALS
DIASTOLIC BLOOD PRESSURE: 92 MMHG | RESPIRATION RATE: 16 BRPM | SYSTOLIC BLOOD PRESSURE: 127 MMHG | HEIGHT: 75 IN | WEIGHT: 230 LBS | OXYGEN SATURATION: 96 % | TEMPERATURE: 98.4 F | BODY MASS INDEX: 28.6 KG/M2 | HEART RATE: 63 BPM

## 2025-07-18 LAB
ANION GAP SERPL CALC-SCNC: 11 MMOL/L (ref 3–18)
BUN SERPL-MCNC: 17 MG/DL (ref 6–23)
BUN/CREAT SERPL: 17 (ref 12–20)
CALCIUM SERPL-MCNC: 8.9 MG/DL (ref 8.5–10.1)
CHLORIDE SERPL-SCNC: 105 MMOL/L (ref 98–107)
CO2 SERPL-SCNC: 23 MMOL/L (ref 21–32)
CREAT SERPL-MCNC: 0.98 MG/DL (ref 0.6–1.3)
EST. AVERAGE GLUCOSE BLD GHB EST-MCNC: 136 MG/DL
GLUCOSE BLD STRIP.AUTO-MCNC: 140 MG/DL (ref 70–110)
GLUCOSE SERPL-MCNC: 133 MG/DL (ref 74–108)
HBA1C MFR BLD: 6.4 % (ref 4.2–5.6)
INR PPP: 1.4 (ref 0.9–1.2)
POTASSIUM SERPL-SCNC: 4.1 MMOL/L (ref 3.5–5.5)
PROTHROMBIN TIME: 17.7 SEC (ref 12–15.1)
SODIUM SERPL-SCNC: 139 MMOL/L (ref 136–145)

## 2025-07-18 PROCEDURE — 36415 COLL VENOUS BLD VENIPUNCTURE: CPT

## 2025-07-18 PROCEDURE — 80048 BASIC METABOLIC PNL TOTAL CA: CPT

## 2025-07-18 PROCEDURE — 99238 HOSP IP/OBS DSCHRG MGMT 30/<: CPT | Performed by: INTERNAL MEDICINE

## 2025-07-18 PROCEDURE — 85610 PROTHROMBIN TIME: CPT

## 2025-07-18 PROCEDURE — G0378 HOSPITAL OBSERVATION PER HR: HCPCS

## 2025-07-18 PROCEDURE — 82962 GLUCOSE BLOOD TEST: CPT

## 2025-07-18 PROCEDURE — 6370000000 HC RX 637 (ALT 250 FOR IP): Performed by: INTERNAL MEDICINE

## 2025-07-18 PROCEDURE — 83036 HEMOGLOBIN GLYCOSYLATED A1C: CPT

## 2025-07-18 PROCEDURE — 6370000000 HC RX 637 (ALT 250 FOR IP)

## 2025-07-18 PROCEDURE — 94761 N-INVAS EAR/PLS OXIMETRY MLT: CPT

## 2025-07-18 RX ORDER — CLOPIDOGREL BISULFATE 75 MG/1
75 TABLET ORAL DAILY
Qty: 30 TABLET | Refills: 3 | Status: SHIPPED | OUTPATIENT
Start: 2025-07-19

## 2025-07-18 RX ADMIN — AMLODIPINE BESYLATE 5 MG: 5 TABLET ORAL at 08:27

## 2025-07-18 RX ADMIN — CLOPIDOGREL BISULFATE 75 MG: 75 TABLET, FILM COATED ORAL at 08:27

## 2025-07-18 RX ADMIN — LISINOPRIL 20 MG: 20 TABLET ORAL at 08:27

## 2025-07-18 RX ADMIN — SPIRONOLACTONE 25 MG: 25 TABLET ORAL at 08:27

## 2025-07-18 RX ADMIN — ASPIRIN 81 MG CHEWABLE TABLET 81 MG: 81 TABLET CHEWABLE at 08:27

## 2025-07-18 RX ADMIN — Medication 400 MG: at 08:27

## 2025-07-18 RX ADMIN — FUROSEMIDE 20 MG: 20 TABLET ORAL at 08:27

## 2025-07-18 RX ADMIN — CARVEDILOL 25 MG: 25 TABLET, FILM COATED ORAL at 08:27

## 2025-07-18 NOTE — PLAN OF CARE
Problem: Chronic Conditions and Co-morbidities  Goal: Patient's chronic conditions and co-morbidity symptoms are monitored and maintained or improved  7/17/2025 2129 by Aaron Richard RN  Outcome: Progressing  7/17/2025 1241 by Sergio Niño RN  Outcome: Progressing  Flowsheets (Taken 7/17/2025 1241)  Care Plan - Patient's Chronic Conditions and Co-Morbidity Symptoms are Monitored and Maintained or Improved:   Monitor and assess patient's chronic conditions and comorbid symptoms for stability, deterioration, or improvement   Collaborate with multidisciplinary team to address chronic and comorbid conditions and prevent exacerbation or deterioration   Update acute care plan with appropriate goals if chronic or comorbid symptoms are exacerbated and prevent overall improvement and discharge     Problem: Cardiovascular - Adult  Goal: Maintains optimal cardiac output and hemodynamic stability  7/17/2025 2129 by Aaron Richard RN  Outcome: Progressing  7/17/2025 1241 by Sergio Niño RN  Outcome: Progressing  Flowsheets (Taken 7/17/2025 1241)  Maintains optimal cardiac output and hemodynamic stability:   Monitor blood pressure and heart rate   Monitor urine output and notify Licensed Independent Practitioner for values outside of normal range   Assess for signs of decreased cardiac output     Problem: Skin/Tissue Integrity - Adult  Goal: Skin integrity remains intact  7/17/2025 2129 by Aaron Richard RN  Outcome: Progressing  7/17/2025 1241 by Sergio Niño RN  Outcome: Progressing  Flowsheets (Taken 7/17/2025 1241)  Skin Integrity Remains Intact:   Monitor for areas of redness and/or skin breakdown   Assess vascular access sites hourly

## 2025-07-18 NOTE — PLAN OF CARE
Problem: Chronic Conditions and Co-morbidities  Goal: Patient's chronic conditions and co-morbidity symptoms are monitored and maintained or improved  Outcome: Adequate for Discharge     Problem: Discharge Planning  Goal: Discharge to home or other facility with appropriate resources  Outcome: Adequate for Discharge     Problem: Cardiovascular - Adult  Goal: Maintains optimal cardiac output and hemodynamic stability  Outcome: Adequate for Discharge     Problem: Skin/Tissue Integrity - Adult  Goal: Skin integrity remains intact  Outcome: Adequate for Discharge

## 2025-07-18 NOTE — CARE COORDINATION
07/18/25 1201   IMM Letter   Observation Status Letter date given: 07/18/25  (patient refused to sign)   Observation Status Letter time given: 1138   Observation Status Letter given to Patient/Family/Significant other/Guardian/POA/by: Joy Rivera         Observation notice provided in writing to patient and/or caregiver as well as verbal explanation of the policy.  Patients who are in outpatient status also receive the Observation notice  Pt declined.        Joy Rivera, KATIEN RN  Care Management

## 2025-07-18 NOTE — CARE COORDINATION
07/18/25 1105   Readmission Assessment   Number of Days since last admission? 8-30 days   Previous Disposition Home with Family   Who is being Interviewed Patient   What was the patient's/caregiver's perception as to why they think they needed to return back to the hospital? Other (Comment)  (per pt, he came back for a planned procedure)   Did you visit your Primary Care Physician after you left the hospital, before you returned this time? No   Why weren't you able to visit your PCP? Other (Comment)  (per pt, he did not see pcp because he was in constant contact with his Cardiologist because of his upcoming procedure)   Did you see a specialist, such as Cardiac, Pulmonary, Orthopedic Physician, etc. after you left the hospital? Yes  (Cardiologist)   Who advised the patient to return to the hospital? Physician   Does the patient report anything that got in the way of taking their medications? No   In our efforts to provide the best possible care to you and others like you, can you think of anything that we could have done to help you after you left the hospital the first time, so that you might not have needed to return so soon? Teaching during hospitalization regarding your illness       KATIE YatesN RN  Care Management

## 2025-07-18 NOTE — DISCHARGE SUMMARY
Cardiology Discharge Summary      Discharge Summary     Patient: Donavan Hidalgo MRN: [id number]  SSN: [id number]    YOB: 1960  Age: 65 y.o.  Sex: male       Addendum: Independently seen and evaluated.  Patient doing well.  No significant right groin tenderness.  Able to ambulate without difficulty.  He will continue on Coumadin, Plavix, and baby aspirin.  He will continue Lovenox for next 48 hours.  He will see his outpatient cardiologist within the next 2-4 weeks.    Admit Date: 7/17/2025    Discharge Date: 7/18/2025      Admission Diagnoses: Abnormal nuclear stress test [R94.39]  Coronary artery disease [I25.10]  CAD in native artery [I25.10]    Discharge Diagnoses:  Abnormal nuclear stress test, CAD    Discharge Condition: Stable    Hospital Course: Patient presented to [facility] for an outpatient coronary angiography. Patient states that over the past 6 years since his open heart surgery, he has been feeling more fatigued. Over the past few weeks, he has been more short of breath at night time. He underwent a pharmacological nuclear stress test 6/26/25 which revealed an EF 23%, no evidence of inducible ischemia. Medium to large perfusion defect in the apical septal segments. An Echo was done 6/2025 which revealed an EF of 45% which had dropped from 50-55% on Echo 3/2025. Because of his ongoing fatigue, shortness of breath, decreased LVEF, and  abnormal nuclear scan, he was scheduled for coronary angiography for 7/18/2025. [facility] revealed:       LV gram not done with bioprosthetic aortic valve.    Dominant RCA is calcified and diffusely disease.  Mid/distal 60% lesion noted with DAMI-3 flow.    Left main is patent.  This only supply circumflex.  Ostial LAD is occluded 100%.    Circumflex coronary artery has proximal/mid focal 90% stenosis surrounded by diffuse 60-70% stenosis.  There is 1 large OM1 with mid segment OM 75-80% stenosis.    He has no vein grafts.  He has LIMA to diagonal

## 2025-07-18 NOTE — CARE COORDINATION
07/18/25 1056   Service Assessment   Patient Orientation Alert and Oriented   Cognition Alert   History Provided By Patient   Primary Caregiver Self   Support Systems Spouse/Significant Other   Patient's Healthcare Decision Maker is: Legal Next of Kin   PCP Verified by CM Yes   Last Visit to PCP Within last 3 months   Prior Functional Level Independent in ADLs/IADLs   Current Functional Level Independent in ADLs/IADLs   Can patient return to prior living arrangement Yes   Ability to make needs known: Good   Family able to assist with home care needs: Yes   Would you like for me to discuss the discharge plan with any other family members/significant others, and if so, who? Yes  (spouse Constanza Hidalgo)   Financial Resources Other (Comment)  (BCBS)   Community Resources None   CM/SW Referral Other (see comment)  (d/c planning)   Social/Functional History   Lives With Spouse   Type of Home House   Home Layout One level   Home Access Stairs to enter with rails   Entrance Stairs - Number of Steps 4   Entrance Stairs - Rails Both   Bathroom Shower/Tub Walk-in shower   Bathroom Toilet Standard   Bathroom Equipment None   Bathroom Accessibility Accessible   Home Equipment None   Receives Help From Family   Prior Level of Assist for ADLs Independent   Prior Level of Assist for Homemaking Independent   Homemaking Responsibilities Yes   Ambulation Assistance Independent   Prior Level of Assist for Transfers Independent   Active  Yes   Mode of Transportation Car   Occupation Retired   Discharge Planning   Type of Residence House   Living Arrangements Spouse/Significant Other   Current Services Prior To Admission None   Potential Assistance Needed N/A   DME Ordered? No   Potential Assistance Purchasing Medications No   Type of Home Care Services None   Patient expects to be discharged to: House   One/Two Story Residence One story   Services At/After Discharge   Transition of Care Consult (CM Consult) Discharge

## 2025-07-25 ENCOUNTER — ANTI-COAG VISIT (OUTPATIENT)
Age: 65
End: 2025-07-25

## 2025-07-25 DIAGNOSIS — Z79.01 LONG TERM (CURRENT) USE OF ANTICOAGULANTS: ICD-10-CM

## 2025-07-25 DIAGNOSIS — Z95.2 HISTORY OF AORTIC VALVE REPLACEMENT: Primary | ICD-10-CM

## 2025-07-25 LAB — INR BLD: 2

## 2025-07-25 NOTE — PROGRESS NOTES
Verbal order and read back per BILL Bustillo NP    The INR is below the therapeutic range.  Please make the following adjustments to Coumadin dosing:     Take Coumadin 7.5mg today then continue Coumadin 5mg daily and 3mg Sunday, Tuesday, and Thursday. Recheck in 1 week.     Shelia Cleveland RN   Clinical Coordinator   [facility] Cardiology at [facility]

## 2025-07-30 ENCOUNTER — TELEPHONE (OUTPATIENT)
Age: 65
End: 2025-07-30

## 2025-07-30 NOTE — TELEPHONE ENCOUNTER
Pt called stating ever since he was put on Plavix he has had terrible gas pains and on and off diarrhea. Pt was advised to try gas x and if the symptoms do not improve to go to PCP.     Shelia Cleveland RN   Clinical Coordinator   [facility]iology at [facility]

## 2025-08-01 LAB — INR BLD: 3

## 2025-08-07 ENCOUNTER — ANTI-COAG VISIT (OUTPATIENT)
Age: 65
End: 2025-08-07

## 2025-08-07 DIAGNOSIS — Z79.01 LONG TERM (CURRENT) USE OF ANTICOAGULANTS: ICD-10-CM

## 2025-08-07 DIAGNOSIS — Z95.2 HISTORY OF AORTIC VALVE REPLACEMENT: Primary | ICD-10-CM

## 2025-08-11 ENCOUNTER — ANTI-COAG VISIT (OUTPATIENT)
Age: 65
End: 2025-08-11

## 2025-08-11 DIAGNOSIS — Z79.01 LONG TERM (CURRENT) USE OF ANTICOAGULANTS: ICD-10-CM

## 2025-08-11 DIAGNOSIS — Z95.2 HISTORY OF AORTIC VALVE REPLACEMENT: Primary | ICD-10-CM

## 2025-08-11 LAB — INR BLD: 3.1

## 2025-08-19 ENCOUNTER — ANTI-COAG VISIT (OUTPATIENT)
Age: 65
End: 2025-08-19

## 2025-08-19 DIAGNOSIS — Z95.2 HISTORY OF AORTIC VALVE REPLACEMENT: Primary | ICD-10-CM

## 2025-08-19 DIAGNOSIS — Z79.01 LONG TERM (CURRENT) USE OF ANTICOAGULANTS: ICD-10-CM

## 2025-08-19 LAB — INR BLD: 4

## 2025-08-21 ENCOUNTER — OFFICE VISIT (OUTPATIENT)
Age: 65
End: 2025-08-21
Payer: COMMERCIAL

## 2025-08-21 VITALS
WEIGHT: 235 LBS | DIASTOLIC BLOOD PRESSURE: 84 MMHG | BODY MASS INDEX: 29.37 KG/M2 | SYSTOLIC BLOOD PRESSURE: 128 MMHG | HEART RATE: 87 BPM | RESPIRATION RATE: 20 BRPM | OXYGEN SATURATION: 97 %

## 2025-08-21 DIAGNOSIS — E78.5 HYPERLIPIDEMIA, UNSPECIFIED HYPERLIPIDEMIA TYPE: ICD-10-CM

## 2025-08-21 DIAGNOSIS — I10 ESSENTIAL (PRIMARY) HYPERTENSION: Primary | ICD-10-CM

## 2025-08-21 DIAGNOSIS — Z95.0 PACEMAKER: ICD-10-CM

## 2025-08-21 DIAGNOSIS — I49.5 SSS (SICK SINUS SYNDROME) (HCC): ICD-10-CM

## 2025-08-21 DIAGNOSIS — I25.10 CORONARY ARTERY DISEASE INVOLVING NATIVE CORONARY ARTERY OF NATIVE HEART WITHOUT ANGINA PECTORIS: ICD-10-CM

## 2025-08-21 PROCEDURE — 3079F DIAST BP 80-89 MM HG: CPT | Performed by: NURSE PRACTITIONER

## 2025-08-21 PROCEDURE — 93000 ELECTROCARDIOGRAM COMPLETE: CPT | Performed by: NURSE PRACTITIONER

## 2025-08-21 PROCEDURE — 3074F SYST BP LT 130 MM HG: CPT | Performed by: NURSE PRACTITIONER

## 2025-08-21 PROCEDURE — 99215 OFFICE O/P EST HI 40 MIN: CPT | Performed by: NURSE PRACTITIONER

## 2025-08-21 ASSESSMENT — ENCOUNTER SYMPTOMS
ABDOMINAL DISTENTION: 0
SHORTNESS OF BREATH: 0
CONSTIPATION: 0
VOMITING: 0
NAUSEA: 0
DIARRHEA: 0
COUGH: 0
CHEST TIGHTNESS: 0
WHEEZING: 0
BLOOD IN STOOL: 0

## 2025-08-27 LAB — INR BLD: 3.6

## 2025-08-29 ENCOUNTER — ANTI-COAG VISIT (OUTPATIENT)
Age: 65
End: 2025-08-29

## 2025-08-29 DIAGNOSIS — Z95.2 HISTORY OF AORTIC VALVE REPLACEMENT: Primary | ICD-10-CM

## 2025-08-29 DIAGNOSIS — Z79.01 LONG TERM (CURRENT) USE OF ANTICOAGULANTS: ICD-10-CM

## 2025-09-02 LAB — INR BLD: 3.5

## 2025-09-04 ENCOUNTER — ANTI-COAG VISIT (OUTPATIENT)
Age: 65
End: 2025-09-04

## 2025-09-04 DIAGNOSIS — Z79.01 LONG TERM (CURRENT) USE OF ANTICOAGULANTS: ICD-10-CM

## 2025-09-04 DIAGNOSIS — Z95.2 HISTORY OF AORTIC VALVE REPLACEMENT: Primary | ICD-10-CM

## (undated) DEVICE — CATHETER DIAG AD L100CM DIA6FR STD JUDKINS L 4 POLYUR COR

## (undated) DEVICE — CATHETER ANGIO JR4 STD 0.038 IN 6 FRX100 CM SUPER TORQUE + ORDER MULT OF 5 EACH

## (undated) DEVICE — INTRODUCER SHTH 6FR CANN L11CM DIL TIP 35MM GRN TUNGSTEN

## (undated) DEVICE — CATH BLLN ANGIO 2.75X12MM SC EUPHORA RX

## (undated) DEVICE — PRESSURE MONITORING SET: Brand: TRUWAVE

## (undated) DEVICE — Device

## (undated) DEVICE — PROCEDURE KIT FLUID MGMT 10 FR CUST MAINFOLD

## (undated) DEVICE — INTRODUCER SHTH 0.018 IN 4 FRX40 CM KT SFT TIP NIT VSI 7266V

## (undated) DEVICE — CATHETER GUID XB 3.5 6 FRX100 CM VISTA BRT TIP

## (undated) DEVICE — DEVICE INFL W ACCS + HEMSTAS VLV INSRT TOOL AND TORQ BASIX

## (undated) DEVICE — SET FLD ADMIN 3 W STPCOCK FIX FEM L BOR 1IN

## (undated) DEVICE — CATHETER ANGIO IMA 038 AD 6 FRX100 CM SUPER TORQUE +

## (undated) DEVICE — HI-TORQUE VERSATURN F GUIDE WIRE FULLY COATED .014 STRAIGHT TIP 190 CM: Brand: HI-TORQUE VERSATURN

## (undated) DEVICE — COPILOT BLEEDBACK CONTROL VALVE: Brand: COPILOT

## (undated) DEVICE — CATH BLLN ANGIO 3.25X12MM NC EUPHORIA RX